# Patient Record
Sex: FEMALE | Race: WHITE | NOT HISPANIC OR LATINO | URBAN - METROPOLITAN AREA
[De-identification: names, ages, dates, MRNs, and addresses within clinical notes are randomized per-mention and may not be internally consistent; named-entity substitution may affect disease eponyms.]

---

## 2018-04-06 ENCOUNTER — OFFICE (AMBULATORY)
Dept: URBAN - METROPOLITAN AREA PATHOLOGY 4 | Facility: PATHOLOGY | Age: 43
End: 2018-04-06
Payer: COMMERCIAL

## 2018-04-06 ENCOUNTER — ON CAMPUS - OUTPATIENT (AMBULATORY)
Dept: URBAN - METROPOLITAN AREA HOSPITAL 2 | Facility: HOSPITAL | Age: 43
End: 2018-04-06
Payer: COMMERCIAL

## 2018-04-06 VITALS
WEIGHT: 176 LBS | SYSTOLIC BLOOD PRESSURE: 112 MMHG | SYSTOLIC BLOOD PRESSURE: 121 MMHG | SYSTOLIC BLOOD PRESSURE: 115 MMHG | SYSTOLIC BLOOD PRESSURE: 114 MMHG | DIASTOLIC BLOOD PRESSURE: 91 MMHG | OXYGEN SATURATION: 99 % | OXYGEN SATURATION: 98 % | TEMPERATURE: 97.5 F | HEART RATE: 76 BPM | HEART RATE: 79 BPM | DIASTOLIC BLOOD PRESSURE: 82 MMHG | HEART RATE: 80 BPM | DIASTOLIC BLOOD PRESSURE: 79 MMHG | HEART RATE: 78 BPM | SYSTOLIC BLOOD PRESSURE: 125 MMHG | RESPIRATION RATE: 20 BRPM | DIASTOLIC BLOOD PRESSURE: 95 MMHG | OXYGEN SATURATION: 97 % | DIASTOLIC BLOOD PRESSURE: 77 MMHG | RESPIRATION RATE: 14 BRPM | SYSTOLIC BLOOD PRESSURE: 109 MMHG | HEART RATE: 81 BPM | HEART RATE: 84 BPM | RESPIRATION RATE: 16 BRPM | SYSTOLIC BLOOD PRESSURE: 139 MMHG | DIASTOLIC BLOOD PRESSURE: 70 MMHG | HEART RATE: 87 BPM | DIASTOLIC BLOOD PRESSURE: 80 MMHG | HEIGHT: 62 IN | DIASTOLIC BLOOD PRESSURE: 75 MMHG

## 2018-04-06 DIAGNOSIS — K64.8 OTHER HEMORRHOIDS: ICD-10-CM

## 2018-04-06 DIAGNOSIS — K63.5 POLYP OF COLON: ICD-10-CM

## 2018-04-06 DIAGNOSIS — D12.2 BENIGN NEOPLASM OF ASCENDING COLON: ICD-10-CM

## 2018-04-06 DIAGNOSIS — Z80.0 FAMILY HISTORY OF MALIGNANT NEOPLASM OF DIGESTIVE ORGANS: ICD-10-CM

## 2018-04-06 PROBLEM — D12.5 BENIGN NEOPLASM OF SIGMOID COLON: Status: ACTIVE | Noted: 2018-04-06

## 2018-04-06 LAB
GI HISTOLOGY: A. UNSPECIFIED: (no result)
GI HISTOLOGY: B. UNSPECIFIED: (no result)
GI HISTOLOGY: PDF REPORT: (no result)

## 2018-04-06 PROCEDURE — 45385 COLONOSCOPY W/LESION REMOVAL: CPT | Mod: 33 | Performed by: INTERNAL MEDICINE

## 2018-04-06 PROCEDURE — 88305 TISSUE EXAM BY PATHOLOGIST: CPT | Mod: 33 | Performed by: INTERNAL MEDICINE

## 2018-04-06 RX ADMIN — PROPOFOL: 10 INJECTION, EMULSION INTRAVENOUS at 10:01

## 2020-08-02 ENCOUNTER — HOSPITAL ENCOUNTER (EMERGENCY)
Facility: HOSPITAL | Age: 45
Discharge: HOME | End: 2020-08-02
Attending: EMERGENCY MEDICINE | Admitting: EMERGENCY MEDICINE

## 2020-08-02 ENCOUNTER — APPOINTMENT (OUTPATIENT)
Dept: GENERAL RADIOLOGY | Facility: HOSPITAL | Age: 45
End: 2020-08-02

## 2020-08-02 VITALS
HEIGHT: 62 IN | OXYGEN SATURATION: 96 % | RESPIRATION RATE: 15 BRPM | BODY MASS INDEX: 34.36 KG/M2 | DIASTOLIC BLOOD PRESSURE: 76 MMHG | TEMPERATURE: 97.7 F | WEIGHT: 186.73 LBS | HEART RATE: 88 BPM | SYSTOLIC BLOOD PRESSURE: 116 MMHG

## 2020-08-02 DIAGNOSIS — R42 DIZZINESS: ICD-10-CM

## 2020-08-02 DIAGNOSIS — K21.9 GASTROESOPHAGEAL REFLUX DISEASE, ESOPHAGITIS PRESENCE NOT SPECIFIED: Primary | ICD-10-CM

## 2020-08-02 DIAGNOSIS — R55 NEAR SYNCOPE: ICD-10-CM

## 2020-08-02 LAB
ANION GAP SERPL CALCULATED.3IONS-SCNC: 12 MMOL/L (ref 5–15)
BASOPHILS # BLD AUTO: 0.1 10*3/MM3 (ref 0–0.2)
BASOPHILS NFR BLD AUTO: 1.4 % (ref 0–1.5)
BUN SERPL-MCNC: 13 MG/DL (ref 6–20)
BUN SERPL-MCNC: ABNORMAL MG/DL
BUN/CREAT SERPL: ABNORMAL
CALCIUM SPEC-SCNC: 9 MG/DL (ref 8.6–10.5)
CHLORIDE SERPL-SCNC: 101 MMOL/L (ref 98–107)
CO2 SERPL-SCNC: 26 MMOL/L (ref 22–29)
CREAT SERPL-MCNC: 0.82 MG/DL (ref 0.57–1)
DEPRECATED RDW RBC AUTO: 40.7 FL (ref 37–54)
EOSINOPHIL # BLD AUTO: 0.3 10*3/MM3 (ref 0–0.4)
EOSINOPHIL NFR BLD AUTO: 3 % (ref 0.3–6.2)
ERYTHROCYTE [DISTWIDTH] IN BLOOD BY AUTOMATED COUNT: 13.3 % (ref 12.3–15.4)
GFR SERPL CREATININE-BSD FRML MDRD: 75 ML/MIN/1.73
GLUCOSE SERPL-MCNC: 173 MG/DL (ref 65–99)
HCT VFR BLD AUTO: 41.9 % (ref 34–46.6)
HGB BLD-MCNC: 14.1 G/DL (ref 12–15.9)
HOLD SPECIMEN: NORMAL
LYMPHOCYTES # BLD AUTO: 2.5 10*3/MM3 (ref 0.7–3.1)
LYMPHOCYTES NFR BLD AUTO: 26.4 % (ref 19.6–45.3)
MCH RBC QN AUTO: 29 PG (ref 26.6–33)
MCHC RBC AUTO-ENTMCNC: 33.5 G/DL (ref 31.5–35.7)
MCV RBC AUTO: 86.5 FL (ref 79–97)
MONOCYTES # BLD AUTO: 0.5 10*3/MM3 (ref 0.1–0.9)
MONOCYTES NFR BLD AUTO: 5.3 % (ref 5–12)
NEUTROPHILS NFR BLD AUTO: 6.1 10*3/MM3 (ref 1.7–7)
NEUTROPHILS NFR BLD AUTO: 63.9 % (ref 42.7–76)
NRBC BLD AUTO-RTO: 0 /100 WBC (ref 0–0.2)
PLATELET # BLD AUTO: 293 10*3/MM3 (ref 140–450)
PMV BLD AUTO: 7.2 FL (ref 6–12)
POTASSIUM SERPL-SCNC: 4.1 MMOL/L (ref 3.5–5.2)
RBC # BLD AUTO: 4.85 10*6/MM3 (ref 3.77–5.28)
SODIUM SERPL-SCNC: 139 MMOL/L (ref 136–145)
TROPONIN T SERPL-MCNC: <0.01 NG/ML (ref 0–0.03)
WBC # BLD AUTO: 9.5 10*3/MM3 (ref 3.4–10.8)
WHOLE BLOOD HOLD SPECIMEN: NORMAL

## 2020-08-02 PROCEDURE — 71045 X-RAY EXAM CHEST 1 VIEW: CPT

## 2020-08-02 PROCEDURE — 84484 ASSAY OF TROPONIN QUANT: CPT | Performed by: EMERGENCY MEDICINE

## 2020-08-02 PROCEDURE — 85025 COMPLETE CBC W/AUTO DIFF WBC: CPT | Performed by: EMERGENCY MEDICINE

## 2020-08-02 PROCEDURE — 93005 ELECTROCARDIOGRAM TRACING: CPT | Performed by: EMERGENCY MEDICINE

## 2020-08-02 PROCEDURE — 80048 BASIC METABOLIC PNL TOTAL CA: CPT | Performed by: EMERGENCY MEDICINE

## 2020-08-02 PROCEDURE — 99284 EMERGENCY DEPT VISIT MOD MDM: CPT

## 2020-08-02 RX ORDER — SODIUM CHLORIDE 0.9 % (FLUSH) 0.9 %
10 SYRINGE (ML) INJECTION AS NEEDED
Status: DISCONTINUED | OUTPATIENT
Start: 2020-08-02 | End: 2020-08-02 | Stop reason: HOSPADM

## 2020-08-02 NOTE — ED PROVIDER NOTES
Subjective   Patient is a 44-year-old female who states he felt like she had heartburn for short period of time then developed some medicine dizziness and near syncope.  This lasted approximately 30 minutes.  She has no complaints at this time other than generalized weakness.          Review of Systems  Negative for headache ears no cough fever chest pain shortness of breath vomiting diarrhea dysuria or other complaint  No past medical history on file.    No Known Allergies    No past surgical history on file.    No family history on file.    Social History     Socioeconomic History   • Marital status:      Spouse name: Not on file   • Number of children: Not on file   • Years of education: Not on file   • Highest education level: Not on file           Objective   Physical Exam  Neurologic exam is nonfocal.  HEENT exam shows TMs to be clear.  Oropharynx comers but sclerae nonicteric.  Neck has no adenopathy JVD or bruits.  Lungs are clear.  Heart is regular rate rhythm without murmur gallop.  Chest is nontender.  Abdomen is soft nontender.  Extremity exam is unremarkable.  Procedures     My EKG interpretation shows normal sinus rhythm with no acute ST change      ED Course      Results for orders placed or performed during the hospital encounter of 08/02/20   Basic Metabolic Panel   Result Value Ref Range    Glucose 173 (H) 65 - 99 mg/dL    BUN      Creatinine 0.82 0.57 - 1.00 mg/dL    Sodium 139 136 - 145 mmol/L    Potassium 4.1 3.5 - 5.2 mmol/L    Chloride 101 98 - 107 mmol/L    CO2 26.0 22.0 - 29.0 mmol/L    Calcium 9.0 8.6 - 10.5 mg/dL    eGFR Non African Amer 75 >60 mL/min/1.73    BUN/Creatinine Ratio      Anion Gap 12.0 5.0 - 15.0 mmol/L   Troponin   Result Value Ref Range    Troponin T <0.010 0.000 - 0.030 ng/mL   CBC Auto Differential   Result Value Ref Range    WBC 9.50 3.40 - 10.80 10*3/mm3    RBC 4.85 3.77 - 5.28 10*6/mm3    Hemoglobin 14.1 12.0 - 15.9 g/dL    Hematocrit 41.9 34.0 - 46.6 %    MCV  86.5 79.0 - 97.0 fL    MCH 29.0 26.6 - 33.0 pg    MCHC 33.5 31.5 - 35.7 g/dL    RDW 13.3 12.3 - 15.4 %    RDW-SD 40.7 37.0 - 54.0 fl    MPV 7.2 6.0 - 12.0 fL    Platelets 293 140 - 450 10*3/mm3    Neutrophil % 63.9 42.7 - 76.0 %    Lymphocyte % 26.4 19.6 - 45.3 %    Monocyte % 5.3 5.0 - 12.0 %    Eosinophil % 3.0 0.3 - 6.2 %    Basophil % 1.4 0.0 - 1.5 %    Neutrophils, Absolute 6.10 1.70 - 7.00 10*3/mm3    Lymphocytes, Absolute 2.50 0.70 - 3.10 10*3/mm3    Monocytes, Absolute 0.50 0.10 - 0.90 10*3/mm3    Eosinophils, Absolute 0.30 0.00 - 0.40 10*3/mm3    Basophils, Absolute 0.10 0.00 - 0.20 10*3/mm3    nRBC 0.0 0.0 - 0.2 /100 WBC   BUN   Result Value Ref Range    BUN 13 6 - 20 mg/dL                                          MDM  Number of Diagnoses or Management Options  Diagnosis management comments: Patient has benign physical exam.  She has no evidence of acute coronary syndrome based on EKG and troponin.  Metabolic panel is normal.  There is no evidence of infectious process.  Patient made at her baseline throughout her ED visit.  She will be discharged to follow with MD for further evaluation as needed.    Risk of Complications, Morbidity, and/or Mortality  Presenting problems: high  Diagnostic procedures: high  Management options: high    Patient Progress  Patient progress: stable      Final diagnoses:   Gastroesophageal reflux disease, esophagitis presence not specified   Dizziness   Near syncope            Tim Marquez MD  08/02/20 6226

## 2023-05-19 PROBLEM — Z80.0 FAMILY HISTORY OF COLON CANCER: Status: ACTIVE | Noted: 2018-04-06

## 2023-09-01 ENCOUNTER — OFFICE (AMBULATORY)
Dept: URBAN - METROPOLITAN AREA PATHOLOGY 4 | Facility: PATHOLOGY | Age: 48
End: 2023-09-01
Payer: COMMERCIAL

## 2023-09-01 ENCOUNTER — ON CAMPUS - OUTPATIENT (AMBULATORY)
Dept: URBAN - METROPOLITAN AREA HOSPITAL 2 | Facility: HOSPITAL | Age: 48
End: 2023-09-01
Payer: COMMERCIAL

## 2023-09-01 VITALS
TEMPERATURE: 96.8 F | SYSTOLIC BLOOD PRESSURE: 124 MMHG | HEART RATE: 85 BPM | OXYGEN SATURATION: 98 % | RESPIRATION RATE: 18 BRPM | DIASTOLIC BLOOD PRESSURE: 87 MMHG | DIASTOLIC BLOOD PRESSURE: 75 MMHG | OXYGEN SATURATION: 99 % | DIASTOLIC BLOOD PRESSURE: 79 MMHG | DIASTOLIC BLOOD PRESSURE: 77 MMHG | OXYGEN SATURATION: 95 % | WEIGHT: 173 LBS | DIASTOLIC BLOOD PRESSURE: 78 MMHG | HEART RATE: 87 BPM | DIASTOLIC BLOOD PRESSURE: 83 MMHG | SYSTOLIC BLOOD PRESSURE: 123 MMHG | RESPIRATION RATE: 16 BRPM | HEART RATE: 93 BPM | OXYGEN SATURATION: 100 % | SYSTOLIC BLOOD PRESSURE: 117 MMHG | DIASTOLIC BLOOD PRESSURE: 92 MMHG | SYSTOLIC BLOOD PRESSURE: 113 MMHG | DIASTOLIC BLOOD PRESSURE: 72 MMHG | SYSTOLIC BLOOD PRESSURE: 108 MMHG | SYSTOLIC BLOOD PRESSURE: 129 MMHG | SYSTOLIC BLOOD PRESSURE: 119 MMHG | HEART RATE: 83 BPM | HEART RATE: 82 BPM | HEART RATE: 90 BPM | SYSTOLIC BLOOD PRESSURE: 128 MMHG | HEIGHT: 62 IN | SYSTOLIC BLOOD PRESSURE: 107 MMHG | HEART RATE: 99 BPM | HEART RATE: 86 BPM | SYSTOLIC BLOOD PRESSURE: 109 MMHG | RESPIRATION RATE: 20 BRPM

## 2023-09-01 DIAGNOSIS — D12.2 BENIGN NEOPLASM OF ASCENDING COLON: ICD-10-CM

## 2023-09-01 DIAGNOSIS — K31.89 OTHER DISEASES OF STOMACH AND DUODENUM: ICD-10-CM

## 2023-09-01 DIAGNOSIS — K31.9 DISEASE OF STOMACH AND DUODENUM, UNSPECIFIED: ICD-10-CM

## 2023-09-01 DIAGNOSIS — K20.80 OTHER ESOPHAGITIS WITHOUT BLEEDING: ICD-10-CM

## 2023-09-01 DIAGNOSIS — Z86.010 PERSONAL HISTORY OF COLONIC POLYPS: ICD-10-CM

## 2023-09-01 DIAGNOSIS — R12 HEARTBURN: ICD-10-CM

## 2023-09-01 PROBLEM — D37.1 NEOPLASM OF UNCERTAIN BEHAVIOR OF STOMACH: Status: ACTIVE | Noted: 2023-09-01

## 2023-09-01 PROBLEM — K63.5 POLYP OF COLON: Status: ACTIVE | Noted: 2023-09-01

## 2023-09-01 PROBLEM — K20.90 ESOPHAGITIS, UNSPECIFIED WITHOUT BLEEDING: Status: ACTIVE | Noted: 2023-09-01

## 2023-09-01 LAB
GI HISTOLOGY: A. SELECT: (no result)
GI HISTOLOGY: B. UNSPECIFIED: (no result)
GI HISTOLOGY: C. UNSPECIFIED: (no result)
GI HISTOLOGY: D. UNSPECIFIED: (no result)
GI HISTOLOGY: PDF REPORT: (no result)

## 2023-09-01 PROCEDURE — 43239 EGD BIOPSY SINGLE/MULTIPLE: CPT | Performed by: INTERNAL MEDICINE

## 2023-09-01 PROCEDURE — 45385 COLONOSCOPY W/LESION REMOVAL: CPT | Mod: 33 | Performed by: INTERNAL MEDICINE

## 2023-09-01 PROCEDURE — 88305 TISSUE EXAM BY PATHOLOGIST: CPT | Performed by: INTERNAL MEDICINE

## 2023-09-01 PROCEDURE — 88342 IMHCHEM/IMCYTCHM 1ST ANTB: CPT | Performed by: INTERNAL MEDICINE

## 2023-09-01 PROCEDURE — 43450 DILATE ESOPHAGUS 1/MULT PASS: CPT | Performed by: INTERNAL MEDICINE

## 2023-09-01 RX ORDER — ESOMEPRAZOLE MAGNESIUM 20 MG/1
20 TABLET, DELAYED RELEASE ORAL
Qty: 90 | Refills: 3 | Status: ACTIVE
Start: 2023-09-01

## 2023-10-27 ENCOUNTER — ON CAMPUS - OUTPATIENT (AMBULATORY)
Dept: URBAN - METROPOLITAN AREA HOSPITAL 77 | Facility: HOSPITAL | Age: 48
End: 2023-10-27

## 2023-10-27 DIAGNOSIS — K29.30 CHRONIC SUPERFICIAL GASTRITIS WITHOUT BLEEDING: ICD-10-CM

## 2023-10-27 DIAGNOSIS — K31.9 DISEASE OF STOMACH AND DUODENUM, UNSPECIFIED: ICD-10-CM

## 2023-10-27 DIAGNOSIS — K22.9 DISEASE OF ESOPHAGUS, UNSPECIFIED: ICD-10-CM

## 2023-10-27 PROCEDURE — 43237 ENDOSCOPIC US EXAM ESOPH: CPT | Performed by: INTERNAL MEDICINE

## 2023-10-27 PROCEDURE — 43239 EGD BIOPSY SINGLE/MULTIPLE: CPT | Performed by: INTERNAL MEDICINE

## 2024-12-16 RX ORDER — DAPAGLIFLOZIN 10 MG/1
TABLET, FILM COATED ORAL
COMMUNITY

## 2024-12-16 RX ORDER — LISINOPRIL 20 MG/1
20 TABLET ORAL DAILY
COMMUNITY

## 2024-12-16 RX ORDER — ASPIRIN 81 MG/1
81 TABLET ORAL DAILY
COMMUNITY

## 2024-12-16 RX ORDER — ATORVASTATIN CALCIUM 10 MG/1
10 TABLET, FILM COATED ORAL DAILY
COMMUNITY

## 2024-12-16 RX ORDER — MULTIPLE VITAMINS W/ MINERALS TAB 9MG-400MCG
1 TAB ORAL DAILY
COMMUNITY

## 2024-12-16 RX ORDER — PLECANATIDE 3 MG/1
TABLET ORAL
COMMUNITY

## 2024-12-29 ENCOUNTER — ANESTHESIA EVENT (OUTPATIENT)
Dept: GASTROENTEROLOGY | Facility: HOSPITAL | Age: 49
End: 2024-12-29
Payer: COMMERCIAL

## 2024-12-29 NOTE — ANESTHESIA PREPROCEDURE EVALUATION
Anesthesia Evaluation     Patient summary reviewed and Nursing notes reviewed   NPO Solid Status: > 8 hours  NPO Liquid Status: > 8 hours           Airway   Mallampati: II  TM distance: >3 FB  Neck ROM: full  No difficulty expected  Dental - normal exam     Pulmonary - negative pulmonary ROS and normal exam    breath sounds clear to auscultation  Cardiovascular - normal exam  Exercise tolerance: unable to assess    ECG reviewed  Rhythm: regular  Rate: normal    (+) hypertension, hyperlipidemia      Neuro/Psych- negative ROS  GI/Hepatic/Renal/Endo    (+) GERD, diabetes mellitus    Musculoskeletal (-) negative ROS    Abdominal  - normal exam   Substance History - negative use     OB/GYN negative ob/gyn ROS         Other - negative ROS                   Anesthesia Plan    ASA 2     general     (Reason for EUS: F/u on Esophageal nodule , MAC)  intravenous induction     Anesthetic plan, risks, benefits, and alternatives have been provided, discussed and informed consent has been obtained with: patient.    CODE STATUS:

## 2024-12-30 ENCOUNTER — ON CAMPUS - OUTPATIENT (AMBULATORY)
Dept: URBAN - METROPOLITAN AREA HOSPITAL 85 | Facility: HOSPITAL | Age: 49
End: 2024-12-30
Payer: COMMERCIAL

## 2024-12-30 ENCOUNTER — ANESTHESIA (OUTPATIENT)
Dept: GASTROENTEROLOGY | Facility: HOSPITAL | Age: 49
End: 2024-12-30
Payer: COMMERCIAL

## 2024-12-30 ENCOUNTER — HOSPITAL ENCOUNTER (OUTPATIENT)
Facility: HOSPITAL | Age: 49
Setting detail: HOSPITAL OUTPATIENT SURGERY
Discharge: HOME | End: 2024-12-30
Attending: INTERNAL MEDICINE | Admitting: INTERNAL MEDICINE
Payer: COMMERCIAL

## 2024-12-30 VITALS
TEMPERATURE: 98.4 F | BODY MASS INDEX: 31.64 KG/M2 | HEART RATE: 77 BPM | OXYGEN SATURATION: 94 % | DIASTOLIC BLOOD PRESSURE: 64 MMHG | SYSTOLIC BLOOD PRESSURE: 109 MMHG | RESPIRATION RATE: 15 BRPM | WEIGHT: 171.96 LBS | HEIGHT: 62 IN

## 2024-12-30 DIAGNOSIS — K29.50 UNSPECIFIED CHRONIC GASTRITIS WITHOUT BLEEDING: ICD-10-CM

## 2024-12-30 DIAGNOSIS — K31.89 GASTRIC NODULE: ICD-10-CM

## 2024-12-30 DIAGNOSIS — K22.89 OTHER SPECIFIED DISEASE OF ESOPHAGUS: ICD-10-CM

## 2024-12-30 DIAGNOSIS — K31.89 OTHER DISEASES OF STOMACH AND DUODENUM: ICD-10-CM

## 2024-12-30 LAB — GLUCOSE BLDC GLUCOMTR-MCNC: 139 MG/DL (ref 70–105)

## 2024-12-30 PROCEDURE — 25010000002 LIDOCAINE PF 2% 2 % SOLUTION

## 2024-12-30 PROCEDURE — 82948 REAGENT STRIP/BLOOD GLUCOSE: CPT

## 2024-12-30 PROCEDURE — 88305 TISSUE EXAM BY PATHOLOGIST: CPT | Performed by: INTERNAL MEDICINE

## 2024-12-30 PROCEDURE — 88342 IMHCHEM/IMCYTCHM 1ST ANTB: CPT | Performed by: INTERNAL MEDICINE

## 2024-12-30 PROCEDURE — 25010000002 FENTANYL CITRATE (PF) 100 MCG/2ML SOLUTION

## 2024-12-30 PROCEDURE — 43239 EGD BIOPSY SINGLE/MULTIPLE: CPT | Performed by: INTERNAL MEDICINE

## 2024-12-30 PROCEDURE — 43259 EGD US EXAM DUODENUM/JEJUNUM: CPT | Performed by: INTERNAL MEDICINE

## 2024-12-30 PROCEDURE — 25010000002 PROPOFOL 10 MG/ML EMULSION

## 2024-12-30 PROCEDURE — 25810000003 SODIUM CHLORIDE 0.9 % SOLUTION

## 2024-12-30 RX ORDER — DIPHENHYDRAMINE HYDROCHLORIDE 50 MG/ML
12.5 INJECTION INTRAMUSCULAR; INTRAVENOUS
Status: DISCONTINUED | OUTPATIENT
Start: 2024-12-30 | End: 2024-12-30 | Stop reason: HOSPADM

## 2024-12-30 RX ORDER — METRONIDAZOLE 500 MG/100ML
INJECTION, SOLUTION INTRAVENOUS
Status: DISCONTINUED
Start: 2024-12-30 | End: 2024-12-30 | Stop reason: WASHOUT

## 2024-12-30 RX ORDER — FENTANYL CITRATE 50 UG/ML
INJECTION, SOLUTION INTRAMUSCULAR; INTRAVENOUS AS NEEDED
Status: DISCONTINUED | OUTPATIENT
Start: 2024-12-30 | End: 2024-12-30 | Stop reason: SURG

## 2024-12-30 RX ORDER — EPHEDRINE SULFATE 5 MG/ML
5 INJECTION INTRAVENOUS ONCE AS NEEDED
Status: DISCONTINUED | OUTPATIENT
Start: 2024-12-30 | End: 2024-12-30 | Stop reason: HOSPADM

## 2024-12-30 RX ORDER — IPRATROPIUM BROMIDE AND ALBUTEROL SULFATE 2.5; .5 MG/3ML; MG/3ML
3 SOLUTION RESPIRATORY (INHALATION) ONCE AS NEEDED
Status: DISCONTINUED | OUTPATIENT
Start: 2024-12-30 | End: 2024-12-30 | Stop reason: HOSPADM

## 2024-12-30 RX ORDER — ONDANSETRON 2 MG/ML
4 INJECTION INTRAMUSCULAR; INTRAVENOUS ONCE AS NEEDED
Status: DISCONTINUED | OUTPATIENT
Start: 2024-12-30 | End: 2024-12-30 | Stop reason: HOSPADM

## 2024-12-30 RX ORDER — CEFTRIAXONE 2 G/1
INJECTION, POWDER, FOR SOLUTION INTRAMUSCULAR; INTRAVENOUS
Status: DISCONTINUED
Start: 2024-12-30 | End: 2024-12-30 | Stop reason: WASHOUT

## 2024-12-30 RX ORDER — LIDOCAINE HYDROCHLORIDE 20 MG/ML
INJECTION, SOLUTION EPIDURAL; INFILTRATION; INTRACAUDAL; PERINEURAL AS NEEDED
Status: DISCONTINUED | OUTPATIENT
Start: 2024-12-30 | End: 2024-12-30 | Stop reason: SURG

## 2024-12-30 RX ORDER — PROPOFOL 10 MG/ML
VIAL (ML) INTRAVENOUS AS NEEDED
Status: DISCONTINUED | OUTPATIENT
Start: 2024-12-30 | End: 2024-12-30 | Stop reason: SURG

## 2024-12-30 RX ORDER — SODIUM CHLORIDE 9 MG/ML
INJECTION, SOLUTION INTRAVENOUS CONTINUOUS PRN
Status: DISCONTINUED | OUTPATIENT
Start: 2024-12-30 | End: 2024-12-30 | Stop reason: SURG

## 2024-12-30 RX ORDER — MEPERIDINE HYDROCHLORIDE 25 MG/ML
12.5 INJECTION INTRAMUSCULAR; INTRAVENOUS; SUBCUTANEOUS
Status: DISCONTINUED | OUTPATIENT
Start: 2024-12-30 | End: 2024-12-30 | Stop reason: HOSPADM

## 2024-12-30 RX ORDER — LABETALOL HYDROCHLORIDE 5 MG/ML
5 INJECTION, SOLUTION INTRAVENOUS
Status: DISCONTINUED | OUTPATIENT
Start: 2024-12-30 | End: 2024-12-30 | Stop reason: HOSPADM

## 2024-12-30 RX ORDER — HYDRALAZINE HYDROCHLORIDE 20 MG/ML
5 INJECTION INTRAMUSCULAR; INTRAVENOUS
Status: DISCONTINUED | OUTPATIENT
Start: 2024-12-30 | End: 2024-12-30 | Stop reason: HOSPADM

## 2024-12-30 RX ORDER — SODIUM CHLORIDE 9 MG/ML
INJECTION, SOLUTION INTRAVENOUS
Status: COMPLETED
Start: 2024-12-30 | End: 2024-12-30

## 2024-12-30 RX ADMIN — LIDOCAINE HYDROCHLORIDE 100 MG: 20 INJECTION, SOLUTION EPIDURAL; INFILTRATION; INTRACAUDAL; PERINEURAL at 10:20

## 2024-12-30 RX ADMIN — SODIUM CHLORIDE: 9 INJECTION, SOLUTION INTRAVENOUS at 10:12

## 2024-12-30 RX ADMIN — PROPOFOL INJECTABLE EMULSION 50 MG: 10 INJECTION, EMULSION INTRAVENOUS at 10:20

## 2024-12-30 RX ADMIN — PROPOFOL INJECTABLE EMULSION 125 MCG/KG/MIN: 10 INJECTION, EMULSION INTRAVENOUS at 10:21

## 2024-12-30 RX ADMIN — FENTANYL CITRATE 25 MCG: 50 INJECTION, SOLUTION INTRAMUSCULAR; INTRAVENOUS at 10:25

## 2024-12-30 RX ADMIN — FENTANYL CITRATE 50 MCG: 50 INJECTION, SOLUTION INTRAMUSCULAR; INTRAVENOUS at 10:40

## 2024-12-30 RX ADMIN — FENTANYL CITRATE 25 MCG: 50 INJECTION, SOLUTION INTRAMUSCULAR; INTRAVENOUS at 10:20

## 2024-12-30 NOTE — OP NOTE
"ESOPHAGOGASTRODUODENOSCOPY WITH ULTRASOUND AND FINE NEEDLE ASPIRATION Procedure Report    Patient Name:  Alejandra Gilbert  YOB: 1975    Date of Surgery:  12/30/2024     Pre-Op Diagnosis:  SUBMUCOSAL ANTRAL NODULE 1 YEAR RECALL       Post-Op Diagnosis Codes:  Antrum submucosal nodule  Inlet patch      Procedure/CPT® Codes:      Procedure(s):  ENDOSCOPIC ULTRASOUND WITH ESOPHAGOGASTRODUODENOSCOPY WITH BIOPSY    Staff:  Surgeon(s):  Derrell Mandujano MD      Anesthesia: Monitored Anesthesia Care    Description of Procedure:  A description of the procedure as well as risks, benefits and alternative methods were explained to the patient who voiced understanding and signed the corresponding consent form. A physical exam was performed and vital signs were monitored throughout the procedure.    An upper GI endoscope was placed into the mouth and proceeded through the esophagus, stomach and second portion of the duodenum without difficulty. The scope was then retroflexed and the fundus was visualized. The procedure was not difficult and there were no immediate complications.    A pentex Radial echoendoscope was advanced into the mouth, esophagus, and into the stomach. The celiac axis was visualized, next the scope was used to visualize the pancreatic neck, body, and tail as well as the L lobe of the liver. The scope was advanced into the duodenal bulb where the pancreatic head and ampulla were visualized as well as the biliary tree and R lobe of the liver. The scope was then advanced to the second portion of the duodenum where the uncinate was brought into view and the ampulla in the \" kissing the papilla\" view. The scope was then withdrawn back into the stomach and out of the esophagus. There was no blood loss.      Impression:    EGD Findings:   1.  Inlet patch in the upper third of the esophagus  2.  1 cm gastric antrum submucosal lesion visualized endoscopically, cold forcep biopsies with bite on bite " taken to attempt to obtain submucosal tissue for pathology  3.  Normal duodenal mucosa visualized to D2    EUS Findings:   1.  1.2 cm x 3 mm hypoechoic submucosal lesion in the antrum unchanged from previous endoscopic ultrasound  2.  Normal pancreatic EUS  3.  Normal biliary EUS  4.  Normal celiac axis    Recommendations:  1.  Given stability of submucosal lesion, can reperform endoscopic ultrasound in 2 years for surveillance of lesion unless pathology is confirmatory of a benign etiology      Derrell Mandujano MD     Date: 12/30/2024    Time: 10:48 EST

## 2024-12-30 NOTE — ANESTHESIA POSTPROCEDURE EVALUATION
Patient: Alejandra Gilbert    Procedure Summary       Date: 12/30/24 Room / Location: [facility] ENDOSCOPY 2 / [facility] ENDOSCOPY    Anesthesia Start: 1012 Anesthesia Stop: 1042    Procedure: ENDOSCOPIC ULTRASOUND WITH ESOPHAGOGASTRODUODENOSCOPY WITH BIOPSY Diagnosis: (SUBMUCOSAL ANTRAL NODULE 1 YEAR RECALL)    Surgeons: Derrell Mandujano MD Provider: Maxwell Rose MD    Anesthesia Type: general ASA Status: 2            Anesthesia Type: general    Vitals  Vitals Value Taken Time   /64 12/30/24 1107   Temp     Pulse 71 12/30/24 1106   Resp 15 12/30/24 1105   SpO2 94 % 12/30/24 1106   Vitals shown include unfiled device data.        Post Anesthesia Care and Evaluation    Patient location during evaluation: PHASE II  Patient participation: complete - patient participated  Level of consciousness: awake  Pain scale: See nurse's notes for pain score.  Pain management: adequate    Airway patency: patent  Anesthetic complications: No anesthetic complications  PONV Status: none  Cardiovascular status: acceptable  Respiratory status: acceptable and spontaneous ventilation  Hydration status: acceptable    Comments: Patient seen and examined postoperatively; vital signs stable; SpO2 greater than or equal to 90%; cardiopulmonary status stable; nausea/vomiting adequately controlled; pain adequately controlled; no apparent anesthesia complications; patient discharged from anesthesia care when discharge criteria were met

## 2024-12-30 NOTE — DISCHARGE INSTRUCTIONS
A responsible adult should stay with you and you should rest quietly for the rest of the day.    Do not drink alcohol, drive, operate any heavy machinery or power tools or make any legal/important decisions for the next 24 hours.     Progress your diet as tolerated.  If you begin to experience severe pain, increased shortness of breath, racing heartbeat or a fever above 101 F, seek immediate medical attention.     Follow up with MD as instructed. Call office for results in 3 to 5 days if needed.     Dr Mandujano @ [phone]           Impression:     EGD Findings:   1.  Inlet patch in the upper third of the esophagus  2.  1 cm gastric antrum submucosal lesion visualized endoscopically, cold forcep biopsies with bite on bite taken to attempt to obtain submucosal tissue for pathology  3.  Normal duodenal mucosa visualized to D2     EUS Findings:   1.  1.2 cm x 3 mm hypoechoic submucosal lesion in the antrum unchanged from previous endoscopic ultrasound  2.  Normal pancreatic EUS  3.  Normal biliary EUS  4.  Normal celiac axis     Recommendations:  1.  Given stability of submucosal lesion, can reperform endoscopic ultrasound in 2 years for surveillance of lesion unless pathology is confirmatory of a benign etiology        Derrell Mandujano MD      Date: 12/30/2024    Time: 10:48 EST

## 2024-12-30 NOTE — H&P
GI CONSULT  NOTE:    Referring Provider:    Kiarra Schumacher MD Obert, Jonathan, MD    Chief complaint: <principal problem not specified>    Subjective .       Pre op diagnosis  SUBMUCOSAL ANTRAL NODULE 1 YEAR RECALL      History of present illness:      Alejandra Gilbert is a 49 y.o. female who presents today for Procedure(s):  ENDOSCOPIC ULTRASOUND for the indications listed below.     The updated Patient Profile was reviewed prior to the procedure, in conjunction with the Physical Exam, including medical conditions, surgical procedures, medications, allergies, family history and social history.     Pre-operatively, I reviewed the indication(s) for the procedure, the risks of the procedure [including but not limited to: unexpected bleeding possibly requiring hospitalization and/or unplanned repeat procedures, perforation possibly requiring surgical treatment, missed lesions and complications of sedation/MAC (also explained by anesthesia staff)].     I have evaluated the patient for risks associated with the planned anesthesia and the procedure to be performed and find the patient an acceptable candidate for IV sedation.    Multiple opportunities were provided for any questions or concerns, and all questions were answered satisfactorily before any anesthesia was administered. We will proceed with the planned procedure.    Past Medical History:  Past Medical History:   Diagnosis Date    Diabetes mellitus     GERD (gastroesophageal reflux disease)     Hyperlipidemia     Hypertension        Past Surgical History:  Past Surgical History:   Procedure Laterality Date    CHOLECYSTECTOMY      COLONOSCOPY      ENDOSCOPY      HYSTERECTOMY      TONSILLECTOMY         Social History:  Social History     Tobacco Use    Smoking status: Never    Smokeless tobacco: Never   Vaping Use    Vaping status: Never Used   Substance Use Topics    Alcohol use: Not Currently    Drug use: Never       Family History:  History reviewed. No  "pertinent family history.    Medications:  Medications Prior to Admission   Medication Sig Dispense Refill Last Dose/Taking    aspirin 81 MG EC tablet Take 1 tablet by mouth Daily.   12/29/2024    atorvastatin (LIPITOR) 10 MG tablet Take 1 tablet by mouth Daily.   12/29/2024    dapagliflozin Propanediol (Farxiga) 10 MG tablet Take  by mouth.   12/29/2024    esomeprazole (nexIUM) 20 MG capsule Take 1 capsule by mouth Every Morning Before Breakfast.   12/29/2024    lisinopril (PRINIVIL,ZESTRIL) 20 MG tablet Take 1 tablet by mouth Daily.   12/29/2024    metFORMIN (GLUCOPHAGE) 500 MG tablet Take 4 tablets by mouth Daily With Breakfast.   12/29/2024    multivitamin with minerals tablet tablet Take 1 tablet by mouth Daily.   12/29/2024    Plecanatide (Trulance) 3 MG tablet Take  by mouth.   12/29/2024       Scheduled Meds:cefTRIAXone, , ,   metroNIDAZOLE, , ,   sodium chloride, , ,       Continuous Infusions:   PRN Meds:.  cefTRIAXone    metroNIDAZOLE    sodium chloride    ALLERGIES:  Patient has no known allergies.    ROS:  The following systems were reviewed and negative;  Constitution:  No fevers, chills, no unintentional weight loss  Skin: no rash, no jaundice  Eyes:  No blurry vision, no eye pain  HENT:  No change in hearing or smell  Resp:  No dyspnea or cough  CV:  No chest pain or palpitations  :  No dysuria, hematuria  Musculoskeletal:  No leg cramps or arthralgias  Neuro:  No tremor, no numbness  Psych:  No depression or confsusion    Objective     Vital Signs:   Vitals:    12/16/24 1513 12/30/24 0917   BP:  116/78   BP Location:  Left arm   Patient Position:  Lying   Pulse:  82   Resp:  15   Temp:  98.4 °F (36.9 °C)   TempSrc:  Oral   SpO2:  95%   Weight: 79.4 kg (175 lb) 78 kg (171 lb 15.3 oz)   Height: 157.5 cm (62\") 157.5 cm (62.01\")       Physical Exam:       General Appearance:    Awake and alert, in no acute distress   Head:    Normocephalic, without obvious abnormality, atraumatic   Throat:   No oral " lesions, no thrush, oral mucosa moist   Lungs:     respirations regular, even and unlabored   Skin:   No rash, no jaundice       Results Review:  Lab Results (last 24 hours)       Procedure Component Value Units Date/Time    POC Glucose Once [[id number]]  (Abnormal) Collected: 12/30/24 0913    Specimen: Blood Updated: 12/30/24 0915     Glucose 139 mg/dL      Comment: Serial Number: [id number]:  [id number]               Imaging Results (Last 24 Hours)       ** No results found for the last 24 hours. **             I reviewed the patient's labs and imaging.    ASSESSMENT AND PLAN:      Active Problems:    * No active hospital problems. *       Procedure(s):  ENDOSCOPIC ULTRASOUND      I discussed the patient's findings and my recommendations with the patient.    Derrell Mandujano MD  12/30/24  10:14 EST

## 2025-01-02 LAB
LAB AP CASE REPORT: NORMAL
LAB AP DIAGNOSIS COMMENT: NORMAL
PATH REPORT.FINAL DX SPEC: NORMAL
PATH REPORT.GROSS SPEC: NORMAL

## 2025-03-25 ENCOUNTER — APPOINTMENT (OUTPATIENT)
Dept: GENERAL RADIOLOGY | Facility: HOSPITAL | Age: 50
End: 2025-03-25
Payer: COMMERCIAL

## 2025-03-25 ENCOUNTER — HOSPITAL ENCOUNTER (OUTPATIENT)
Facility: HOSPITAL | Age: 50
Setting detail: OBSERVATION
Discharge: HOME | End: 2025-03-26
Attending: EMERGENCY MEDICINE | Admitting: EMERGENCY MEDICINE
Payer: COMMERCIAL

## 2025-03-25 ENCOUNTER — APPOINTMENT (OUTPATIENT)
Dept: CARDIOLOGY | Facility: HOSPITAL | Age: 50
End: 2025-03-25
Payer: COMMERCIAL

## 2025-03-25 ENCOUNTER — APPOINTMENT (OUTPATIENT)
Dept: CT IMAGING | Facility: HOSPITAL | Age: 50
End: 2025-03-25
Payer: COMMERCIAL

## 2025-03-25 DIAGNOSIS — R00.2 PALPITATIONS: ICD-10-CM

## 2025-03-25 DIAGNOSIS — R07.89 OTHER CHEST PAIN: ICD-10-CM

## 2025-03-25 DIAGNOSIS — R07.9 CHEST PAIN, UNSPECIFIED TYPE: Primary | ICD-10-CM

## 2025-03-25 LAB
ALBUMIN SERPL-MCNC: 4.6 G/DL (ref 3.5–5.2)
ALBUMIN/GLOB SERPL: 1.8 G/DL
ALP SERPL-CCNC: 104 U/L (ref 39–117)
ALT SERPL W P-5'-P-CCNC: 31 U/L (ref 1–33)
ANION GAP SERPL CALCULATED.3IONS-SCNC: 14.7 MMOL/L (ref 5–15)
AST SERPL-CCNC: 23 U/L (ref 1–32)
BASOPHILS # BLD AUTO: 0.08 10*3/MM3 (ref 0–0.2)
BASOPHILS NFR BLD AUTO: 0.9 % (ref 0–1.5)
BILIRUB SERPL-MCNC: 0.9 MG/DL (ref 0–1.2)
BUN SERPL-MCNC: 9 MG/DL (ref 6–20)
BUN/CREAT SERPL: 11.8 (ref 7–25)
CALCIUM SPEC-SCNC: 9.6 MG/DL (ref 8.6–10.5)
CHLORIDE SERPL-SCNC: 103 MMOL/L (ref 98–107)
CO2 SERPL-SCNC: 22.3 MMOL/L (ref 22–29)
CORTIS SERPL-MCNC: 7.53 MCG/DL
CREAT SERPL-MCNC: 0.76 MG/DL (ref 0.57–1)
D DIMER PPP FEU-MCNC: <0.27 MCGFEU/ML (ref 0–0.51)
DEPRECATED RDW RBC AUTO: 42.2 FL (ref 37–54)
EGFRCR SERPLBLD CKD-EPI 2021: 95 ML/MIN/1.73
EOSINOPHIL # BLD AUTO: 0.34 10*3/MM3 (ref 0–0.4)
EOSINOPHIL NFR BLD AUTO: 3.8 % (ref 0.3–6.2)
ERYTHROCYTE [DISTWIDTH] IN BLOOD BY AUTOMATED COUNT: 13 % (ref 12.3–15.4)
GEN 5 1HR TROPONIN T REFLEX: 14 NG/L
GLOBULIN UR ELPH-MCNC: 2.6 GM/DL
GLUCOSE BLDC GLUCOMTR-MCNC: 170 MG/DL (ref 70–105)
GLUCOSE SERPL-MCNC: 91 MG/DL (ref 65–99)
HBA1C MFR BLD: 6.9 % (ref 4.8–5.6)
HCT VFR BLD AUTO: 47.4 % (ref 34–46.6)
HGB BLD-MCNC: 15.4 G/DL (ref 12–15.9)
HOLD SPECIMEN: NORMAL
HOLD SPECIMEN: NORMAL
IMM GRANULOCYTES # BLD AUTO: 0.03 10*3/MM3 (ref 0–0.05)
IMM GRANULOCYTES NFR BLD AUTO: 0.3 % (ref 0–0.5)
LYMPHOCYTES # BLD AUTO: 2.55 10*3/MM3 (ref 0.7–3.1)
LYMPHOCYTES NFR BLD AUTO: 28.6 % (ref 19.6–45.3)
MAGNESIUM SERPL-MCNC: 2.2 MG/DL (ref 1.6–2.6)
MCH RBC QN AUTO: 28.7 PG (ref 26.6–33)
MCHC RBC AUTO-ENTMCNC: 32.5 G/DL (ref 31.5–35.7)
MCV RBC AUTO: 88.3 FL (ref 79–97)
MONOCYTES # BLD AUTO: 0.69 10*3/MM3 (ref 0.1–0.9)
MONOCYTES NFR BLD AUTO: 7.7 % (ref 5–12)
NEUTROPHILS NFR BLD AUTO: 5.23 10*3/MM3 (ref 1.7–7)
NEUTROPHILS NFR BLD AUTO: 58.7 % (ref 42.7–76)
NRBC BLD AUTO-RTO: 0 /100 WBC (ref 0–0.2)
PLATELET # BLD AUTO: 311 10*3/MM3 (ref 140–450)
PMV BLD AUTO: 9.2 FL (ref 6–12)
POTASSIUM SERPL-SCNC: 4.2 MMOL/L (ref 3.5–5.2)
PROT SERPL-MCNC: 7.2 G/DL (ref 6–8.5)
RBC # BLD AUTO: 5.37 10*6/MM3 (ref 3.77–5.28)
SODIUM SERPL-SCNC: 140 MMOL/L (ref 136–145)
TROPONIN T NUMERIC DELTA: 1 NG/L
TROPONIN T SERPL HS-MCNC: 13 NG/L
TSH SERPL DL<=0.05 MIU/L-ACNC: 1.25 UIU/ML (ref 0.27–4.2)
WBC NRBC COR # BLD AUTO: 8.92 10*3/MM3 (ref 3.4–10.8)
WHOLE BLOOD HOLD COAG: NORMAL
WHOLE BLOOD HOLD SPECIMEN: NORMAL

## 2025-03-25 PROCEDURE — G0378 HOSPITAL OBSERVATION PER HR: HCPCS

## 2025-03-25 PROCEDURE — 82948 REAGENT STRIP/BLOOD GLUCOSE: CPT

## 2025-03-25 PROCEDURE — 99204 OFFICE O/P NEW MOD 45 MIN: CPT | Performed by: INTERNAL MEDICINE

## 2025-03-25 PROCEDURE — 93306 TTE W/DOPPLER COMPLETE: CPT | Performed by: INTERNAL MEDICINE

## 2025-03-25 PROCEDURE — 36415 COLL VENOUS BLD VENIPUNCTURE: CPT

## 2025-03-25 PROCEDURE — 99285 EMERGENCY DEPT VISIT HI MDM: CPT

## 2025-03-25 PROCEDURE — 71045 X-RAY EXAM CHEST 1 VIEW: CPT

## 2025-03-25 PROCEDURE — 93356 MYOCRD STRAIN IMG SPCKL TRCK: CPT

## 2025-03-25 PROCEDURE — 93005 ELECTROCARDIOGRAM TRACING: CPT | Performed by: EMERGENCY MEDICINE

## 2025-03-25 PROCEDURE — 93356 MYOCRD STRAIN IMG SPCKL TRCK: CPT | Performed by: INTERNAL MEDICINE

## 2025-03-25 PROCEDURE — 82533 TOTAL CORTISOL: CPT | Performed by: INTERNAL MEDICINE

## 2025-03-25 PROCEDURE — 83036 HEMOGLOBIN GLYCOSYLATED A1C: CPT | Performed by: NURSE PRACTITIONER

## 2025-03-25 PROCEDURE — 83735 ASSAY OF MAGNESIUM: CPT | Performed by: EMERGENCY MEDICINE

## 2025-03-25 PROCEDURE — 93306 TTE W/DOPPLER COMPLETE: CPT

## 2025-03-25 PROCEDURE — 80050 GENERAL HEALTH PANEL: CPT | Performed by: EMERGENCY MEDICINE

## 2025-03-25 PROCEDURE — 84484 ASSAY OF TROPONIN QUANT: CPT | Performed by: EMERGENCY MEDICINE

## 2025-03-25 PROCEDURE — 85379 FIBRIN DEGRADATION QUANT: CPT | Performed by: EMERGENCY MEDICINE

## 2025-03-25 RX ORDER — ASPIRIN 81 MG/1
324 TABLET, CHEWABLE ORAL ONCE
Status: COMPLETED | OUTPATIENT
Start: 2025-03-25 | End: 2025-03-25

## 2025-03-25 RX ORDER — SODIUM CHLORIDE 9 MG/ML
40 INJECTION, SOLUTION INTRAVENOUS AS NEEDED
Status: DISCONTINUED | OUTPATIENT
Start: 2025-03-25 | End: 2025-03-26 | Stop reason: HOSPADM

## 2025-03-25 RX ORDER — NITROGLYCERIN 0.4 MG/1
0.4 TABLET SUBLINGUAL
Status: DISCONTINUED | OUTPATIENT
Start: 2025-03-25 | End: 2025-03-25 | Stop reason: SDUPTHER

## 2025-03-25 RX ORDER — DEXTROSE MONOHYDRATE 25 G/50ML
25 INJECTION, SOLUTION INTRAVENOUS
Status: DISCONTINUED | OUTPATIENT
Start: 2025-03-25 | End: 2025-03-26 | Stop reason: HOSPADM

## 2025-03-25 RX ORDER — ACETAMINOPHEN 325 MG/1
650 TABLET ORAL EVERY 4 HOURS PRN
Status: DISCONTINUED | OUTPATIENT
Start: 2025-03-25 | End: 2025-03-26 | Stop reason: HOSPADM

## 2025-03-25 RX ORDER — SODIUM CHLORIDE 0.9 % (FLUSH) 0.9 %
10 SYRINGE (ML) INJECTION EVERY 12 HOURS SCHEDULED
Status: DISCONTINUED | OUTPATIENT
Start: 2025-03-25 | End: 2025-03-26 | Stop reason: HOSPADM

## 2025-03-25 RX ORDER — METOPROLOL TARTRATE 25 MG/1
25 TABLET, FILM COATED ORAL EVERY 6 HOURS
Status: DISCONTINUED | OUTPATIENT
Start: 2025-03-25 | End: 2025-03-26 | Stop reason: HOSPADM

## 2025-03-25 RX ORDER — ASPIRIN 81 MG/1
81 TABLET ORAL DAILY
Status: DISCONTINUED | OUTPATIENT
Start: 2025-03-26 | End: 2025-03-26 | Stop reason: HOSPADM

## 2025-03-25 RX ORDER — NICOTINE POLACRILEX 4 MG
15 LOZENGE BUCCAL
Status: DISCONTINUED | OUTPATIENT
Start: 2025-03-25 | End: 2025-03-26 | Stop reason: HOSPADM

## 2025-03-25 RX ORDER — SODIUM CHLORIDE 0.9 % (FLUSH) 0.9 %
10 SYRINGE (ML) INJECTION AS NEEDED
Status: DISCONTINUED | OUTPATIENT
Start: 2025-03-25 | End: 2025-03-26 | Stop reason: HOSPADM

## 2025-03-25 RX ORDER — ACETAMINOPHEN 160 MG/5ML
650 SOLUTION ORAL EVERY 4 HOURS PRN
Status: DISCONTINUED | OUTPATIENT
Start: 2025-03-25 | End: 2025-03-26 | Stop reason: HOSPADM

## 2025-03-25 RX ORDER — IBUPROFEN 600 MG/1
1 TABLET ORAL
Status: DISCONTINUED | OUTPATIENT
Start: 2025-03-25 | End: 2025-03-26 | Stop reason: HOSPADM

## 2025-03-25 RX ORDER — NITROGLYCERIN 0.4 MG/1
0.4 TABLET SUBLINGUAL
Status: DISCONTINUED | OUTPATIENT
Start: 2025-03-26 | End: 2025-03-26 | Stop reason: HOSPADM

## 2025-03-25 RX ORDER — AMOXICILLIN 250 MG
2 CAPSULE ORAL 2 TIMES DAILY PRN
Status: DISCONTINUED | OUTPATIENT
Start: 2025-03-25 | End: 2025-03-26 | Stop reason: HOSPADM

## 2025-03-25 RX ORDER — POLYETHYLENE GLYCOL 3350 17 G/17G
17 POWDER, FOR SOLUTION ORAL DAILY PRN
Status: DISCONTINUED | OUTPATIENT
Start: 2025-03-25 | End: 2025-03-26 | Stop reason: HOSPADM

## 2025-03-25 RX ORDER — LEVOCETIRIZINE DIHYDROCHLORIDE 5 MG/1
5 TABLET, FILM COATED ORAL DAILY
COMMUNITY

## 2025-03-25 RX ORDER — ALUMINA, MAGNESIA, AND SIMETHICONE 2400; 2400; 240 MG/30ML; MG/30ML; MG/30ML
15 SUSPENSION ORAL EVERY 6 HOURS PRN
Status: DISCONTINUED | OUTPATIENT
Start: 2025-03-25 | End: 2025-03-26 | Stop reason: HOSPADM

## 2025-03-25 RX ORDER — BISACODYL 5 MG/1
5 TABLET, DELAYED RELEASE ORAL DAILY PRN
Status: DISCONTINUED | OUTPATIENT
Start: 2025-03-25 | End: 2025-03-26 | Stop reason: HOSPADM

## 2025-03-25 RX ORDER — COSYNTROPIN 0.25 MG/ML
0.25 INJECTION, POWDER, FOR SOLUTION INTRAMUSCULAR; INTRAVENOUS ONCE
Status: DISCONTINUED | OUTPATIENT
Start: 2025-03-26 | End: 2025-03-26 | Stop reason: HOSPADM

## 2025-03-25 RX ORDER — NITROGLYCERIN 0.4 MG/1
0.4 TABLET SUBLINGUAL
Status: DISCONTINUED | OUTPATIENT
Start: 2025-03-25 | End: 2025-03-26 | Stop reason: HOSPADM

## 2025-03-25 RX ORDER — ACETAMINOPHEN 650 MG/1
650 SUPPOSITORY RECTAL EVERY 4 HOURS PRN
Status: DISCONTINUED | OUTPATIENT
Start: 2025-03-25 | End: 2025-03-26 | Stop reason: HOSPADM

## 2025-03-25 RX ORDER — CETIRIZINE HYDROCHLORIDE 10 MG/1
10 TABLET ORAL DAILY
Status: DISCONTINUED | OUTPATIENT
Start: 2025-03-26 | End: 2025-03-26 | Stop reason: HOSPADM

## 2025-03-25 RX ORDER — ONDANSETRON 4 MG/1
4 TABLET, ORALLY DISINTEGRATING ORAL EVERY 6 HOURS PRN
Status: DISCONTINUED | OUTPATIENT
Start: 2025-03-25 | End: 2025-03-26 | Stop reason: HOSPADM

## 2025-03-25 RX ORDER — METOPROLOL TARTRATE 1 MG/ML
5 INJECTION, SOLUTION INTRAVENOUS
Status: DISCONTINUED | OUTPATIENT
Start: 2025-03-25 | End: 2025-03-26 | Stop reason: HOSPADM

## 2025-03-25 RX ORDER — BISACODYL 10 MG
10 SUPPOSITORY, RECTAL RECTAL DAILY PRN
Status: DISCONTINUED | OUTPATIENT
Start: 2025-03-25 | End: 2025-03-26 | Stop reason: HOSPADM

## 2025-03-25 RX ORDER — PANTOPRAZOLE SODIUM 40 MG/1
40 TABLET, DELAYED RELEASE ORAL
Status: DISCONTINUED | OUTPATIENT
Start: 2025-03-26 | End: 2025-03-26 | Stop reason: HOSPADM

## 2025-03-25 RX ORDER — ATORVASTATIN CALCIUM 10 MG/1
10 TABLET, FILM COATED ORAL DAILY
Status: DISCONTINUED | OUTPATIENT
Start: 2025-03-26 | End: 2025-03-26 | Stop reason: HOSPADM

## 2025-03-25 RX ORDER — LISINOPRIL 5 MG/1
10 TABLET ORAL DAILY
Status: DISCONTINUED | OUTPATIENT
Start: 2025-03-26 | End: 2025-03-26 | Stop reason: HOSPADM

## 2025-03-25 RX ORDER — METOPROLOL TARTRATE 25 MG/1
25 TABLET, FILM COATED ORAL EVERY 8 HOURS
Status: DISCONTINUED | OUTPATIENT
Start: 2025-03-25 | End: 2025-03-25

## 2025-03-25 RX ORDER — NITROGLYCERIN 0.4 MG/1
0.8 TABLET SUBLINGUAL
Status: COMPLETED | OUTPATIENT
Start: 2025-03-26 | End: 2025-03-26

## 2025-03-25 RX ORDER — INSULIN LISPRO 100 [IU]/ML
2-9 INJECTION, SOLUTION INTRAVENOUS; SUBCUTANEOUS
Status: DISCONTINUED | OUTPATIENT
Start: 2025-03-25 | End: 2025-03-26 | Stop reason: HOSPADM

## 2025-03-25 RX ORDER — ONDANSETRON 2 MG/ML
4 INJECTION INTRAMUSCULAR; INTRAVENOUS EVERY 6 HOURS PRN
Status: DISCONTINUED | OUTPATIENT
Start: 2025-03-25 | End: 2025-03-26 | Stop reason: HOSPADM

## 2025-03-25 RX ORDER — NITROGLYCERIN 0.4 MG/1
0.8 TABLET SUBLINGUAL
Status: DISCONTINUED | OUTPATIENT
Start: 2025-03-25 | End: 2025-03-25 | Stop reason: SDUPTHER

## 2025-03-25 RX ADMIN — METOPROLOL TARTRATE 25 MG: 25 TABLET, FILM COATED ORAL at 15:12

## 2025-03-25 RX ADMIN — METOPROLOL TARTRATE 25 MG: 25 TABLET, FILM COATED ORAL at 23:20

## 2025-03-25 RX ADMIN — ASPIRIN 81 MG CHEWABLE TABLET 324 MG: 81 TABLET CHEWABLE at 13:42

## 2025-03-25 NOTE — ED PROVIDER NOTES
Subjective   History of Present Illness  49-year-old female states she was sitting at her desk at work today and started feeling her heart racing with associated diaphoresis and lightheadedness.  She describes associated chest pressure.  States it lasted a few minutes and then resolved.  Notes that she did have some persistent mild tachycardia since that time.  She reports no ongoing chest pain.  Review of Systems    Past Medical History:   Diagnosis Date    Diabetes mellitus     GERD (gastroesophageal reflux disease)     Hyperlipidemia     Hypertension        No Known Allergies    Past Surgical History:   Procedure Laterality Date    CHOLECYSTECTOMY      COLONOSCOPY      ENDOSCOPY      HYSTERECTOMY      TONSILLECTOMY      UPPER ENDOSCOPIC ULTRASOUND W/ FNA N/A 12/30/2024    Procedure: ENDOSCOPIC ULTRASOUND WITH ESOPHAGOGASTRODUODENOSCOPY WITH BIOPSY;  Surgeon: Derrell Mandujano MD;  Location: [facility] ENDOSCOPY;  Service: Gastroenterology;  Laterality: N/A;  GASTRIC NODULE       No family history on file.  Patient reports mother with heart disease  Social History     Socioeconomic History    Marital status:    Tobacco Use    Smoking status: Never    Smokeless tobacco: Never   Vaping Use    Vaping status: Never Used   Substance and Sexual Activity    Alcohol use: Not Currently    Drug use: Never    Sexual activity: Defer     Prior to Admission medications    Medication Sig Start Date End Date Taking? Authorizing Provider   aspirin 81 MG EC tablet Take 1 tablet by mouth Daily.    Jean Altamirano MD   atorvastatin (LIPITOR) 10 MG tablet Take 1 tablet by mouth Daily.    Jean Altamirano MD   dapagliflozin Propanediol (Farxiga) 10 MG tablet Take  by mouth.    Jean Altamirano MD   esomeprazole (nexIUM) 20 MG capsule Take 1 capsule by mouth Every Morning Before Breakfast.    Jean Altamirano MD   lisinopril (PRINIVIL,ZESTRIL) 20 MG tablet Take 1 tablet by mouth Daily.    Jean Altamirano MD  "  metFORMIN (GLUCOPHAGE) 500 MG tablet Take 4 tablets by mouth Daily With Breakfast.    ProviderJean MD   multivitamin with minerals tablet tablet Take 1 tablet by mouth Daily.    ProviderJean MD   Plecanatide (Trulance) 3 MG tablet Take  by mouth.    ProviderJean MD     /91   Pulse 89   Temp 98.5 °F (36.9 °C) (Oral)   Resp 17   Ht 157.5 cm (62\")   Wt 78 kg (171 lb 15.3 oz)   SpO2 96%   BMI 31.45 kg/m²         Objective   Physical Exam  General: Well-developed well-appearing, no acute distress, alert and appropriate  Eyes:  sclera nonicteric  HEENT: Mucous membranes moist, no mucosal swelling  Neck: Supple, no nuchal rigidity,   Respirations: Respirations nonlabored, equal breath sounds bilaterally, clear lungs  Heart regular rate and rhythm, no murmurs rubs or gallops,   Abdomen soft nontender nondistended, no hepatosplenomegaly,   Extremities no clubbing cyanosis or edema, calves are symmetric and nontender  Neuro cranial nerves grossly intact, no focal limb deficits  Psych oriented, pleasant affect  Skin no rash, brisk cap refill  Procedures           ED Course      Results for orders placed or performed during the hospital encounter of 03/25/25   ECG 12 Lead Chest Pain    Collection Time: 03/25/25  1:18 PM   Result Value Ref Range    QT Interval 334 ms    QTC Interval 421 ms   Comprehensive Metabolic Panel    Collection Time: 03/25/25  1:45 PM    Specimen: Blood   Result Value Ref Range    Glucose 91 65 - 99 mg/dL    BUN 9 6 - 20 mg/dL    Creatinine 0.76 0.57 - 1.00 mg/dL    Sodium 140 136 - 145 mmol/L    Potassium 4.2 3.5 - 5.2 mmol/L    Chloride 103 98 - 107 mmol/L    CO2 22.3 22.0 - 29.0 mmol/L    Calcium 9.6 8.6 - 10.5 mg/dL    Total Protein 7.2 6.0 - 8.5 g/dL    Albumin 4.6 3.5 - 5.2 g/dL    ALT (SGPT) 31 1 - 33 U/L    AST (SGOT) 23 1 - 32 U/L    Alkaline Phosphatase 104 39 - 117 U/L    Total Bilirubin 0.9 0.0 - 1.2 mg/dL    Globulin 2.6 gm/dL    A/G Ratio 1.8 g/dL    " BUN/Creatinine Ratio 11.8 7.0 - 25.0    Anion Gap 14.7 5.0 - 15.0 mmol/L    eGFR 95.0 >60.0 mL/min/1.73   High Sensitivity Troponin T    Collection Time: 03/25/25  1:45 PM    Specimen: Blood   Result Value Ref Range    HS Troponin T 13 <14 ng/L   CBC Auto Differential    Collection Time: 03/25/25  1:45 PM    Specimen: Blood   Result Value Ref Range    WBC 8.92 3.40 - 10.80 10*3/mm3    RBC 5.37 (H) 3.77 - 5.28 10*6/mm3    Hemoglobin 15.4 12.0 - 15.9 g/dL    Hematocrit 47.4 (H) 34.0 - 46.6 %    MCV 88.3 79.0 - 97.0 fL    MCH 28.7 26.6 - 33.0 pg    MCHC 32.5 31.5 - 35.7 g/dL    RDW 13.0 12.3 - 15.4 %    RDW-SD 42.2 37.0 - 54.0 fl    MPV 9.2 6.0 - 12.0 fL    Platelets 311 140 - 450 10*3/mm3    Neutrophil % 58.7 42.7 - 76.0 %    Lymphocyte % 28.6 19.6 - 45.3 %    Monocyte % 7.7 5.0 - 12.0 %    Eosinophil % 3.8 0.3 - 6.2 %    Basophil % 0.9 0.0 - 1.5 %    Immature Grans % 0.3 0.0 - 0.5 %    Neutrophils, Absolute 5.23 1.70 - 7.00 10*3/mm3    Lymphocytes, Absolute 2.55 0.70 - 3.10 10*3/mm3    Monocytes, Absolute 0.69 0.10 - 0.90 10*3/mm3    Eosinophils, Absolute 0.34 0.00 - 0.40 10*3/mm3    Basophils, Absolute 0.08 0.00 - 0.20 10*3/mm3    Immature Grans, Absolute 0.03 0.00 - 0.05 10*3/mm3    nRBC 0.0 0.0 - 0.2 /100 WBC   D-dimer, Quantitative    Collection Time: 03/25/25  1:45 PM    Specimen: Blood   Result Value Ref Range    D-Dimer, Quantitative <0.27 0.00 - 0.51 MCGFEU/mL   TSH Rfx On Abnormal To Free T4    Collection Time: 03/25/25  1:45 PM    Specimen: Blood   Result Value Ref Range    TSH 1.250 0.270 - 4.200 uIU/mL   Magnesium    Collection Time: 03/25/25  1:45 PM    Specimen: Blood   Result Value Ref Range    Magnesium 2.2 1.6 - 2.6 mg/dL   Cortisol    Collection Time: 03/25/25  1:45 PM    Specimen: Blood   Result Value Ref Range    Cortisol 7.53   mcg/dL   Green Top (Gel)    Collection Time: 03/25/25  1:45 PM   Result Value Ref Range    Extra Tube Hold for add-ons.    Lavender Top    Collection Time: 03/25/25  1:45  PM   Result Value Ref Range    Extra Tube hold for add-on    Gold Top - SST    Collection Time: 03/25/25  1:45 PM   Result Value Ref Range    Extra Tube Hold for add-ons.    Light Blue Top    Collection Time: 03/25/25  1:45 PM   Result Value Ref Range    Extra Tube Hold for add-ons.    High Sensitivity Troponin T 1Hr    Collection Time: 03/25/25  3:17 PM    Specimen: Blood   Result Value Ref Range    HS Troponin T 14 (H) <14 ng/L    Troponin T Numeric Delta 1 Abnormal if >/=3 ng/L     XR Chest 1 View  Result Date: 3/25/2025  Impression: An acute pulmonary process is not apparent. Electronically Signed: Sam Moses MD  3/25/2025 2:15 PM EDT  Workstation ID: [id number]                HEART Score: 4   Shared Decision Making  I discussed the findings with the patient/patient representative who is in agreement with the treatment plan and the final disposition.  Risks and benefits of discharge and/or observation/admission were discussed: Yes                                      Medical Decision Making  Differential diagnosis including acute coronary syndrome, dysrhythmia, pulmonary embolus, congestive heart failure, dissection, pneumonia, pneumothorax    D-dimer screen normal, pulmonary embolus felt to be unlikely, she is having no back pain or neurodeficits to suggest dissection.  She does have a moderate heart score.  Patient was seen by Dr. Sam during the emergency room course ordered additional cardiac testing and recommends overnight monitoring and chest pain evaluation.  On reexamination patient is resting comfortably without chest pain or dyspnea and stable on the monitor.  She will be placed in hospital observation for further evaluation.  Was advised of findings and agreeable to the plan.    Problems Addressed:  Chest pain, unspecified type: complicated acute illness or injury    Amount and/or Complexity of Data Reviewed  Labs: ordered. Decision-making details documented in ED Course.     Details: D-dimer normal,  high-sensitivity troponin indeterminate range with normal delta, CBC normal, comprehensive metabolic panel essentially normal, TSH normal  Radiology: ordered and independent interpretation performed.     Details: My independent interpretation of chest x-ray image no pneumonia or congestive failure  ECG/medicine tests: ordered and independent interpretation performed.     Details: My EKG interpretation sinus rhythm, no acute ST or T wave abnormality, rate of 95    Risk  OTC drugs.  Prescription drug management.  Decision regarding hospitalization.        Final diagnoses:   Chest pain, unspecified type       ED Disposition  ED Disposition       ED Disposition   Decision to Admit    Condition   --    Comment   --               No follow-up provider specified.       Medication List      No changes were made to your prescriptions during this visit.            Nadeem Marie MD  03/25/25 7115

## 2025-03-25 NOTE — CONSULTS
Referring Provider: Dr. Marie    Attending: Nadeem Marie MD    Reason for Consultation:    Palpitation  Chest discomfort  Shortness of breath  Diaphoresis    Chief complaint:    Chest pain  Palpitation       History of present illness:     Patient is 49-year-old white female whose past medical history is significant for hypertension, hyperlipidemia, diabetes mellitus, who is admitted with symptom of chest pain and palpitation.    Patient reports he was at her work when she started having symptom of palpitation.  She described this as a racing of the heart.  She noticed her heart rate was 100 to 190 bpm.  It was associated with chest pain and tightness.  Patient also felt short of breath and diaphoretic.  Patient initially called PCP but they recommended to go to urgent care center.  In the urgent care center her heart rate was elevated however the EKG available to me showed sinus tachycardia.  Patient was transferred to [facility].  Patient is noted to have sinus tachycardia with heart rate of 127 bpm.  At the time of my examination patient had heart rate of 94 to 96 bpm and it was sinus rhythm.  Patient was chest pain-free.    Patient had few episode of such palpitation and chest pain in the past.  Patient is diabetic.  No previous history of CAD, PCI or MI.        Review of Systems  Review of Systems   Constitutional: Negative for chills and fever.   HENT:  Negative for ear discharge and nosebleeds.    Eyes:  Negative for discharge and redness.   Cardiovascular:  Positive for chest pain. Negative for orthopnea, palpitations, paroxysmal nocturnal dyspnea and syncope.   Respiratory:  Positive for shortness of breath. Negative for cough and wheezing.    Endocrine: Negative for heat intolerance.   Skin:  Negative for rash.   Musculoskeletal:  Negative for arthritis and myalgias.   Gastrointestinal:  Negative for abdominal pain, melena, nausea and vomiting.   Genitourinary:  Negative for dysuria and  "hematuria.   Neurological:  Negative for dizziness, light-headedness, numbness and tremors.   Psychiatric/Behavioral:  Negative for depression. The patient is not nervous/anxious.        Past Medical History  Past Medical History:   Diagnosis Date    Diabetes mellitus     GERD (gastroesophageal reflux disease)     Hyperlipidemia     Hypertension     and   Past Surgical History:   Procedure Laterality Date    CHOLECYSTECTOMY      COLONOSCOPY      ENDOSCOPY      HYSTERECTOMY      TONSILLECTOMY      UPPER ENDOSCOPIC ULTRASOUND W/ FNA N/A 12/30/2024    Procedure: ENDOSCOPIC ULTRASOUND WITH ESOPHAGOGASTRODUODENOSCOPY WITH BIOPSY;  Surgeon: Derrell Mandujano MD;  Location: [facility] ENDOSCOPY;  Service: Gastroenterology;  Laterality: N/A;  GASTRIC NODULE       Family History  No family history on file.    Social History  Social History     Socioeconomic History    Marital status:    Tobacco Use    Smoking status: Never    Smokeless tobacco: Never   Vaping Use    Vaping status: Never Used   Substance and Sexual Activity    Alcohol use: Not Currently    Drug use: Never    Sexual activity: Defer       Objective     Physical Exam:    Vital Signs  Vitals:    03/25/25 1304   BP: 129/89   Pulse: 104   Resp: 17   Temp: 98.5 °F (36.9 °C)   TempSrc: Oral   SpO2: 95%   Weight: 78 kg (171 lb 15.3 oz)   Height: 157.5 cm (62\")       Weight  Flowsheet Rows      Flowsheet Row First Filed Value   Admission Height 157.5 cm (62\") Documented at 03/25/2025 1304   Admission Weight 78 kg (171 lb 15.3 oz) Documented at 03/25/2025 1304             Constitutional:       Appearance: Well-developed.   Eyes:      General: No scleral icterus.        Right eye: No discharge.   HENT:      Head: Normocephalic and atraumatic.   Neck:      Thyroid: No thyromegaly.      Lymphadenopathy: No cervical adenopathy.   Pulmonary:      Effort: Pulmonary effort is normal. No respiratory distress.      Breath sounds: Normal breath sounds. No wheezing. No rales. "   Cardiovascular:      Normal rate. Regular rhythm.      No gallop.    Edema:     Peripheral edema absent.   Abdominal:      Tenderness: There is no abdominal tenderness.   Skin:     Findings: No erythema or rash.   Neurological:      Mental Status: Alert and oriented to person, place, and time.         Results Review:  Lab Results (last 24 hours)       Procedure Component Value Units Date/Time    TSH Rfx On Abnormal To Free T4 [[id number]]  (Normal) Collected: 03/25/25 1345    Specimen: Blood Updated: 03/25/25 1447     TSH 1.250 uIU/mL     Magnesium [[id number]]  (Normal) Collected: 03/25/25 1345    Specimen: Blood Updated: 03/25/25 1447     Magnesium 2.2 mg/dL     Comprehensive Metabolic Panel [[id number]] Collected: 03/25/25 1345    Specimen: Blood Updated: 03/25/25 1419     Glucose 91 mg/dL      BUN 9 mg/dL      Creatinine 0.76 mg/dL      Sodium 140 mmol/L      Potassium 4.2 mmol/L      Chloride 103 mmol/L      CO2 22.3 mmol/L      Calcium 9.6 mg/dL      Total Protein 7.2 g/dL      Albumin 4.6 g/dL      ALT (SGPT) 31 U/L      AST (SGOT) 23 U/L      Alkaline Phosphatase 104 U/L      Total Bilirubin 0.9 mg/dL      Globulin 2.6 gm/dL      A/G Ratio 1.8 g/dL      BUN/Creatinine Ratio 11.8     Anion Gap 14.7 mmol/L      eGFR 95.0 mL/min/1.73     Narrative:      GFR Categories in Chronic Kidney Disease (CKD)      GFR Category          GFR (mL/min/1.73)    Interpretation  G1                     90 or greater         Normal or high (1)  G2                      60-89                Mild decrease (1)  G3a                   45-59                Mild to moderate decrease  G3b                   30-44                Moderate to severe decrease  G4                    15-29                Severe decrease  G5                    14 or less           Kidney failure          (1)In the absence of evidence of kidney disease, neither GFR category G1 or G2 fulfill the criteria for CKD.    eGFR calculation 2021 CKD-EPI creatinine equation,  "which does not include race as a factor    High Sensitivity Troponin T [[id number]]  (Normal) Collected: 03/25/25 1345    Specimen: Blood Updated: 03/25/25 1419     HS Troponin T 13 ng/L     Narrative:      High Sensitive Troponin T Reference Range:  <14.0 ng/L- Negative Female for AMI  <22.0 ng/L- Negative Male for AMI  >=14 - Abnormal Female indicating possible myocardial injury.  >=22 - Abnormal Male indicating possible myocardial injury.   Clinicians would have to utilize clinical acumen, EKG, Troponin, and serial changes to determine if it is an Acute Myocardial Infarction or myocardial injury due to an underlying chronic condition.         D-dimer, Quantitative [[id number]]  (Normal) Collected: 03/25/25 1345    Specimen: Blood Updated: 03/25/25 1419     D-Dimer, Quantitative <0.27 MCGFEU/mL     Narrative:      According to the assay 's published package insert, a normal (<0.50 MCGFEU/mL) D-dimer result in conjunction with a non-high clinical probability assessment, excludes deep vein thrombosis (DVT) and pulmonary embolism (PE) with high sensitivity.    D-dimer values increase with age and this can make VTE exclusion of an older population difficult. To address this, the [employer], based on best available evidence and recent guidelines, recommends that clinicians use age-adjusted D-dimer thresholds in patients greater than 50 years of age with: a) a low probability of PE who do not meet all Pulmonary Embolism Rule Out Criteria, or b) in those with intermediate probability of PE.   The formula for an age-adjusted D-dimer cut-off is \"age/100\".  For example, a 60 year old patient would have an age-adjusted cut-off of 0.60 MCGFEU/mL and an 80 year old 0.80 MCGFEU/mL.    [place] Draw [[id number]] Collected: 03/25/25 1345    Specimen: Blood Updated: 03/25/25 1401    Narrative:      The following orders were created for panel order [place] Draw.  Procedure                               " Abnormality         Status                     ---------                               -----------         ------                     Green Top (Gel)[[id number]]                                  Final result               Lavender Top[[id number]]                                     Final result               Gold Top - SST[[id number]]                                   Final result               Light Blue Top[[id number]]                                   Final result                 Please view results for these tests on the individual orders.    Light Blue Top [[id number]] Collected: 03/25/25 1345    Specimen: Blood Updated: 03/25/25 1401     Extra Tube Hold for add-ons.     Comment: Auto resulted       Green Top (Gel) [[id number]] Collected: 03/25/25 1345    Specimen: Blood Updated: 03/25/25 1401     Extra Tube Hold for add-ons.     Comment: Auto resulted.       Lavender Top [[id number]] Collected: 03/25/25 1345    Specimen: Blood Updated: 03/25/25 1401     Extra Tube hold for add-on     Comment: Auto resulted       Gold Top - SST [[id number]] Collected: 03/25/25 1345    Specimen: Blood Updated: 03/25/25 1401     Extra Tube Hold for add-ons.     Comment: Auto resulted.       CBC & Differential [[id number]]  (Abnormal) Collected: 03/25/25 1345    Specimen: Blood Updated: 03/25/25 1354    Narrative:      The following orders were created for panel order CBC & Differential.  Procedure                               Abnormality         Status                     ---------                               -----------         ------                     CBC Auto Differential[[id number]]        Abnormal            Final result                 Please view results for these tests on the individual orders.    CBC Auto Differential [[id number]]  (Abnormal) Collected: 03/25/25 1345    Specimen: Blood Updated: 03/25/25 1354     WBC 8.92 10*3/mm3      RBC 5.37 10*6/mm3      Hemoglobin 15.4 g/dL      Hematocrit 47.4 %      MCV 88.3 fL      MCH  28.7 pg      MCHC 32.5 g/dL      RDW 13.0 %      RDW-SD 42.2 fl      MPV 9.2 fL      Platelets 311 10*3/mm3      Neutrophil % 58.7 %      Lymphocyte % 28.6 %      Monocyte % 7.7 %      Eosinophil % 3.8 %      Basophil % 0.9 %      Immature Grans % 0.3 %      Neutrophils, Absolute 5.23 10*3/mm3      Lymphocytes, Absolute 2.55 10*3/mm3      Monocytes, Absolute 0.69 10*3/mm3      Eosinophils, Absolute 0.34 10*3/mm3      Basophils, Absolute 0.08 10*3/mm3      Immature Grans, Absolute 0.03 10*3/mm3      nRBC 0.0 /100 WBC           Imaging Results (Last 72 Hours)       Procedure Component Value Units Date/Time    XR Chest 1 View [[id number]] Collected: 03/25/25 1414     Updated: 03/25/25 1417    Narrative:      XR CHEST 1 VW    Date of Exam: 3/25/2025 1:51 PM EDT    Indication: Chest Pain Protocol  Chest Pain Protocol    Comparison: August 2, 2020    Findings:  The heart is not definitely enlarged. The lungs seem clear. There are no pleural effusions.      Impression:      Impression:  An acute pulmonary process is not apparent.        Electronically Signed: Sam Moses MD    3/25/2025 2:15 PM EDT    Workstation ID: [id number]          ECG 12 Lead Chest Pain   Preliminary Result   HEART RATE=95  bpm   RR Wtyfnoam=296  ms   WA Yaosrftq=409  ms   P Horizontal Axis=13  deg   P Front Axis=54  deg   QRSD Interval=70  ms   QT Gtlleuuk=584  ms   DRiA=052  ms   QRS Axis=-15  deg   T Wave Axis=60  deg   - NORMAL ECG -   Sinus rhythm   When compared with ECG of 02-Aug-2020 03:49:12,   No significant change   Date and Time of Study:2025-03-25 13:18:40      ECG 12 Lead Chest Pain    (Results Pending)     CBC    Results from last 7 days   Lab Units 03/25/25  1345   WBC 10*3/mm3 8.92   HEMOGLOBIN g/dL 15.4   PLATELETS 10*3/mm3 311     BMP   Results from last 7 days   Lab Units 03/25/25  1345   SODIUM mmol/L 140   POTASSIUM mmol/L 4.2   CHLORIDE mmol/L 103   CO2 mmol/L 22.3   BUN mg/dL 9   CREATININE mg/dL 0.76   GLUCOSE mg/dL 91    MAGNESIUM mg/dL 2.2     CMP   Results from last 7 days   Lab Units 03/25/25  1345   SODIUM mmol/L 140   POTASSIUM mmol/L 4.2   CHLORIDE mmol/L 103   CO2 mmol/L 22.3   BUN mg/dL 9   CREATININE mg/dL 0.76   GLUCOSE mg/dL 91   ALBUMIN g/dL 4.6   BILIRUBIN mg/dL 0.9   ALK PHOS U/L 104   AST (SGOT) U/L 23   ALT (SGPT) U/L 31     Medication Review  Scheduled Meds:metoprolol tartrate, 25 mg, Oral, Q6H      Continuous Infusions:   PRN Meds:.  sodium chloride    Assessment:      * No active hospital problems. *        Plan:    MDM:    1.  Palpitation:    Patient is having palpitation so far the sinus tachycardia is demonstrated.  No other arrhythmia like atrial fibrillation or SVT.  I would start Lopressor 25 mg every 6.  I would recommend proceed with MCOT on discharge.    2.  Sinus tachycardia:    Patient should be excluded for anemia and hyperthyroidism.  I would also consider in the future to exclude hypercortisolism as well as pheochromocytoma.  ACTH stimulation test should be done.    3.  Hypertension:    Start Lopressor.    4.  Diabetes mellitus:    Patient is on metformin and Jardiance.    5.  Chest pain:    Proceed with stress test and echocardiogram    I discussed the patient's findings and my recommendations with patient and her family    Damion Sam MD  03/25/25  14:47 EDT

## 2025-03-25 NOTE — CASE MANAGEMENT/SOCIAL WORK
Discharge Planning Assessment  [facility]     Patient Name: Alejandra Gilbert  MRN: [id number]  Today's Date: 3/25/2025    Admit Date: 3/25/2025    Plan: From Home with Family   Discharge Needs Assessment       Row Name 03/25/25 1717       Living Environment    People in Home child(fuad), dependent;child(fuad), adult    Current Living Arrangements home    Potentially Unsafe Housing Conditions none    In the past 12 months has the electric, gas, oil, or water company threatened to shut off services in your home? No    Primary Care Provided by self    Provides Primary Care For child(fuad)    Family Caregiver if Needed child(fuad), adult    Family Caregiver Names Daughter-Stacey; Mother-Jemma    Quality of Family Relationships supportive    Able to Return to Prior Arrangements yes    Living Arrangement Comments Home with Family       Resource/Environmental Concerns    Resource/Environmental Concerns none    Transportation Concerns none       Transportation Needs    In the past 12 months, has lack of transportation kept you from medical appointments or from getting medications? no    In the past 12 months, has lack of transportation kept you from meetings, work, or from getting things needed for daily living? No       Food Insecurity    Within the past 12 months, you worried that your food would run out before you got the money to buy more. Never true    Within the past 12 months, the food you bought just didn't last and you didn't have money to get more. Never true       Transition Planning    Patient/Family Anticipates Transition to home    Patient/Family Anticipated Services at Transition none    Transportation Anticipated car, drives self;family or friend will provide       Discharge Needs Assessment    Readmission Within the Last 30 Days no previous admission in last 30 days    Equipment Currently Used at Home none    Concerns to be Addressed discharge planning    Do you want help finding or keeping work or a job? I do not  need or want help    Do you want help with school or training? For example, starting or completing job training or getting a high school diploma, GED or equivalent No    Anticipated Changes Related to Illness none    Provided Post Acute Provider List? N/A    Provided Post Acute Provider Quality & Resource List? N/A    Offered/Gave Vendor List no                   Discharge Plan       Row Name 03/25/25 1718       Plan    Plan From Home with Family    Patient/Family in Agreement with Plan unable to assess    Plan Comments Met with patient at bedside, confirmed PCP and Pharm. States she is independent with ADLs and IADLs, denies financial concerns and family will provide dc transportation. Discussed dc plan, declines HH or SNF needs and anticipates routine home.                        DC Barriers: Cardiology consult; Repeat Labs and EKG                       Demographic Summary       Row Name 03/25/25 1711       General Information    Admission Type observation    Arrived From home    Referral Source emergency department    Reason for Consult discharge planning    Preferred Language English       Contact Information    Permission Granted to Share Info With                    Functional Status       Row Name 03/25/25 1716       Functional Status    Usual Activity Tolerance excellent    Current Activity Tolerance good       Physical Activity    On average, how many days per week do you engage in moderate to strenuous exercise (like a brisk walk)? 0 days    On average, how many minutes do you engage in exercise at this level? 0 min    Number of minutes of exercise per week 0       Assessment of Health Literacy    How often do you have someone help you read hospital materials? Never    How often do you have problems learning about your medical condition because of difficulty understanding written information? Occasionally    How often do you have a problem understanding what is told to you about your medical  condition? Occasionally    How confident are you filling out medical forms by yourself? Extremely    Health Literacy Excellent       Functional Status, IADL    Medications independent    Meal Preparation independent    Housekeeping independent    Laundry independent    Shopping independent    If for any reason you need help with day-to-day activities such as bathing, preparing meals, shopping, managing finances, etc., do you get the help you need? I don't need any help       Mental Status    General Appearance WDL WDL       Mental Status Summary    Recent Changes in Mental Status/Cognitive Functioning no changes             Met with patient in room wearing PPE: mask    Maintained distance greater than six feet and spent less than 15 minutes in the room    Aida Bojorquez RN    Phone [phone]  Fax [phone]

## 2025-03-26 ENCOUNTER — APPOINTMENT (OUTPATIENT)
Dept: CT IMAGING | Facility: HOSPITAL | Age: 50
End: 2025-03-26
Payer: COMMERCIAL

## 2025-03-26 ENCOUNTER — APPOINTMENT (OUTPATIENT)
Dept: RESPIRATORY THERAPY | Facility: HOSPITAL | Age: 50
End: 2025-03-26
Payer: COMMERCIAL

## 2025-03-26 VITALS
HEIGHT: 62 IN | RESPIRATION RATE: 15 BRPM | HEART RATE: 73 BPM | SYSTOLIC BLOOD PRESSURE: 111 MMHG | TEMPERATURE: 97.5 F | OXYGEN SATURATION: 94 % | BODY MASS INDEX: 31.44 KG/M2 | DIASTOLIC BLOOD PRESSURE: 77 MMHG | WEIGHT: 170.86 LBS

## 2025-03-26 LAB
AORTIC DIMENSIONLESS INDEX: 0.73 (DI)
AV MEAN PRESS GRAD SYS DOP V1V2: 3 MMHG
AV VMAX SYS DOP: 124 CM/SEC
BH CV ECHO LEFT VENTRICLE GLOBAL LONGITUDINAL STRAIN: -17.1 %
BH CV ECHO MEAS - ACS: 1.8 CM
BH CV ECHO MEAS - AO MAX PG: 6.2 MMHG
BH CV ECHO MEAS - AO V2 VTI: 23.2 CM
BH CV ECHO MEAS - AVA(I,D): 2.08 CM2
BH CV ECHO MEAS - EDV(CUBED): 68.9 ML
BH CV ECHO MEAS - EDV(MOD-SP4): 61.7 ML
BH CV ECHO MEAS - EF(MOD-SP4): 63 %
BH CV ECHO MEAS - ESV(CUBED): 19.7 ML
BH CV ECHO MEAS - ESV(MOD-SP4): 22.8 ML
BH CV ECHO MEAS - FS: 34.1 %
BH CV ECHO MEAS - IVS/LVPW: 1 CM
BH CV ECHO MEAS - IVSD: 0.8 CM
BH CV ECHO MEAS - LA DIMENSION: 3.8 CM
BH CV ECHO MEAS - LAT PEAK E' VEL: 12.7 CM/SEC
BH CV ECHO MEAS - LV DIASTOLIC VOL/BSA (35-75): 34.5 CM2
BH CV ECHO MEAS - LV MASS(C)D: 97.3 GRAMS
BH CV ECHO MEAS - LV MAX PG: 3.5 MMHG
BH CV ECHO MEAS - LV MEAN PG: 2 MMHG
BH CV ECHO MEAS - LV SYSTOLIC VOL/BSA (12-30): 12.7 CM2
BH CV ECHO MEAS - LV V1 MAX: 93.3 CM/SEC
BH CV ECHO MEAS - LV V1 VTI: 17 CM
BH CV ECHO MEAS - LVIDD: 4.1 CM
BH CV ECHO MEAS - LVIDS: 2.7 CM
BH CV ECHO MEAS - LVOT AREA: 2.8 CM2
BH CV ECHO MEAS - LVOT DIAM: 1.9 CM
BH CV ECHO MEAS - LVPWD: 0.8 CM
BH CV ECHO MEAS - MED PEAK E' VEL: 10.8 CM/SEC
BH CV ECHO MEAS - MV A MAX VEL: 56.7 CM/SEC
BH CV ECHO MEAS - MV DEC SLOPE: 551 CM/SEC2
BH CV ECHO MEAS - MV DEC TIME: 0.15 SEC
BH CV ECHO MEAS - MV E MAX VEL: 90.2 CM/SEC
BH CV ECHO MEAS - MV E/A: 1.59
BH CV ECHO MEAS - MV MAX PG: 3.1 MMHG
BH CV ECHO MEAS - MV MEAN PG: 1 MMHG
BH CV ECHO MEAS - MV P1/2T: 45.8 MSEC
BH CV ECHO MEAS - MV V2 VTI: 17.2 CM
BH CV ECHO MEAS - MVA(P1/2T): 4.8 CM2
BH CV ECHO MEAS - MVA(VTI): 2.8 CM2
BH CV ECHO MEAS - PA ACC TIME: 0.1 SEC
BH CV ECHO MEAS - PA V2 MAX: 78.4 CM/SEC
BH CV ECHO MEAS - RAP SYSTOLE: 3 MMHG
BH CV ECHO MEAS - RV MAX PG: 1.85 MMHG
BH CV ECHO MEAS - RV V1 MAX: 68 CM/SEC
BH CV ECHO MEAS - RV V1 VTI: 13.7 CM
BH CV ECHO MEAS - RVDD: 2.5 CM
BH CV ECHO MEAS - RVSP: 17.6 MMHG
BH CV ECHO MEAS - SV(LVOT): 48.2 ML
BH CV ECHO MEAS - SV(MOD-SP4): 38.9 ML
BH CV ECHO MEAS - SVI(LVOT): 27 ML/M2
BH CV ECHO MEAS - SVI(MOD-SP4): 21.8 ML/M2
BH CV ECHO MEAS - TAPSE (>1.6): 1.91 CM
BH CV ECHO MEAS - TR MAX PG: 14.6 MMHG
BH CV ECHO MEAS - TR MAX VEL: 191 CM/SEC
BH CV ECHO MEASUREMENTS AVERAGE E/E' RATIO: 7.68
BH CV XLRA - TDI S': 15.1 CM/SEC
GLUCOSE BLDC GLUCOMTR-MCNC: 124 MG/DL (ref 70–105)
GLUCOSE BLDC GLUCOMTR-MCNC: 137 MG/DL (ref 70–105)
LEFT ATRIUM VOLUME INDEX: 17.8 ML/M2
LV EF BIPLANE MOD: 63 %
QT INTERVAL: 334 MS
QTC INTERVAL: 421 MS
SINUS: 2.8 CM

## 2025-03-26 PROCEDURE — 99214 OFFICE O/P EST MOD 30 MIN: CPT

## 2025-03-26 PROCEDURE — G0378 HOSPITAL OBSERVATION PER HR: HCPCS

## 2025-03-26 PROCEDURE — 25810000003 SODIUM CHLORIDE 0.9 % SOLUTION: Performed by: INTERNAL MEDICINE

## 2025-03-26 PROCEDURE — 75574 CT ANGIO HRT W/3D IMAGE: CPT

## 2025-03-26 PROCEDURE — 25510000001 IOPAMIDOL PER 1 ML: Performed by: EMERGENCY MEDICINE

## 2025-03-26 PROCEDURE — 75574 CT ANGIO HRT W/3D IMAGE: CPT | Performed by: STUDENT IN AN ORGANIZED HEALTH CARE EDUCATION/TRAINING PROGRAM

## 2025-03-26 PROCEDURE — 82948 REAGENT STRIP/BLOOD GLUCOSE: CPT | Performed by: NURSE PRACTITIONER

## 2025-03-26 RX ORDER — IOPAMIDOL 755 MG/ML
100 INJECTION, SOLUTION INTRAVASCULAR
Status: COMPLETED | OUTPATIENT
Start: 2025-03-26 | End: 2025-03-26

## 2025-03-26 RX ORDER — SODIUM CHLORIDE 9 MG/ML
100 INJECTION, SOLUTION INTRAVENOUS CONTINUOUS
Status: DISCONTINUED | OUTPATIENT
Start: 2025-03-26 | End: 2025-03-26 | Stop reason: HOSPADM

## 2025-03-26 RX ORDER — METOPROLOL SUCCINATE 25 MG/1
25 TABLET, EXTENDED RELEASE ORAL 2 TIMES DAILY
Qty: 60 TABLET | Refills: 2 | Status: SHIPPED | OUTPATIENT
Start: 2025-03-26

## 2025-03-26 RX ORDER — METOPROLOL TARTRATE 50 MG
100 TABLET ORAL ONCE
Status: COMPLETED | OUTPATIENT
Start: 2025-03-26 | End: 2025-03-26

## 2025-03-26 RX ADMIN — METOPROLOL TARTRATE 25 MG: 25 TABLET, FILM COATED ORAL at 14:08

## 2025-03-26 RX ADMIN — ACETAMINOPHEN 650 MG: 325 TABLET, FILM COATED ORAL at 14:08

## 2025-03-26 RX ADMIN — IOPAMIDOL 100 ML: 755 INJECTION, SOLUTION INTRAVENOUS at 11:17

## 2025-03-26 RX ADMIN — SODIUM CHLORIDE 250 ML: 9 INJECTION, SOLUTION INTRAVENOUS at 09:16

## 2025-03-26 RX ADMIN — Medication 10 ML: at 09:19

## 2025-03-26 RX ADMIN — LISINOPRIL 10 MG: 5 TABLET ORAL at 12:27

## 2025-03-26 RX ADMIN — METOPROLOL TARTRATE 25 MG: 25 TABLET, FILM COATED ORAL at 08:10

## 2025-03-26 RX ADMIN — PANTOPRAZOLE SODIUM 40 MG: 40 TABLET, DELAYED RELEASE ORAL at 05:50

## 2025-03-26 RX ADMIN — ATORVASTATIN CALCIUM 10 MG: 10 TABLET ORAL at 12:26

## 2025-03-26 RX ADMIN — METOPROLOL TARTRATE 100 MG: 50 TABLET, FILM COATED ORAL at 09:16

## 2025-03-26 RX ADMIN — CETIRIZINE HYDROCHLORIDE 10 MG: 10 TABLET, FILM COATED ORAL at 12:27

## 2025-03-26 RX ADMIN — ASPIRIN 81 MG: 81 TABLET, COATED ORAL at 12:26

## 2025-03-26 RX ADMIN — NITROGLYCERIN 0.8 MG: 0.4 TABLET SUBLINGUAL at 11:05

## 2025-03-26 RX ADMIN — SODIUM CHLORIDE 100 ML/HR: 9 INJECTION, SOLUTION INTRAVENOUS at 11:33

## 2025-03-26 NOTE — DISCHARGE SUMMARY
[facility]    Kiarra Schumacher MD    CHIEF COMPLAINT:     Chest pain    HISTORY OF PRESENT ILLNESS:    HPI    ED  49-year-old female states she was sitting at her desk at work today and started feeling her heart racing with associated diaphoresis and lightheadedness. She describes associated chest pressure. States it lasted a few minutes and then resolved. Notes that she did have some persistent mild tachycardia since that time. She reports no ongoing chest pain.      Observation 3/26/25  Pt concurs with er hpi. Cardiology consulted. Cta coronary pending. Echo pending. Awaiting labs to check cortisol.     Past Medical History:   Diagnosis Date    Diabetes mellitus     GERD (gastroesophageal reflux disease)     Hyperlipidemia     Hypertension      Past Surgical History:   Procedure Laterality Date    CHOLECYSTECTOMY      COLONOSCOPY      ENDOSCOPY      HYSTERECTOMY      TONSILLECTOMY      UPPER ENDOSCOPIC ULTRASOUND W/ FNA N/A 12/30/2024    Procedure: ENDOSCOPIC ULTRASOUND WITH ESOPHAGOGASTRODUODENOSCOPY WITH BIOPSY;  Surgeon: Derrell Mandujano MD;  Location: [facility] ENDOSCOPY;  Service: Gastroenterology;  Laterality: N/A;  GASTRIC NODULE     History reviewed. No pertinent family history.  Social History     Tobacco Use    Smoking status: Never    Smokeless tobacco: Never   Vaping Use    Vaping status: Never Used   Substance Use Topics    Alcohol use: Not Currently    Drug use: Never     Medications Prior to Admission   Medication Sig Dispense Refill Last Dose/Taking    aspirin 81 MG EC tablet Take 1 tablet by mouth Daily.   3/25/2025    atorvastatin (LIPITOR) 10 MG tablet Take 1 tablet by mouth Daily.   3/25/2025    dapagliflozin Propanediol (Farxiga) 10 MG tablet Take 10 mg by mouth Daily.   3/25/2025    esomeprazole (nexIUM) 20 MG capsule Take 1 capsule by mouth Every Morning Before Breakfast.   3/25/2025    levocetirizine (XYZAL) 5 MG tablet Take 1 tablet by mouth Daily.   3/25/2025     lisinopril (PRINIVIL,ZESTRIL) 10 MG tablet Take 1 tablet by mouth Daily.   3/25/2025    metFORMIN (GLUCOPHAGE) 500 MG tablet Take 4 tablets by mouth Daily With Breakfast.   3/25/2025    multivitamin with minerals tablet tablet Take 1 tablet by mouth Daily.   3/25/2025    Plecanatide (Trulance) 3 MG tablet Take 1 tablet by mouth Daily.   3/25/2025     Allergies:  Patient has no known allergies.    Immunization History   Administered Date(s) Administered    COVID-19 (PFIZER) Purple Cap Monovalent 05/27/2021, 12/16/2021           REVIEW OF SYSTEMS:    ROS    Cardiovascular:  Positive for chest pain and palpitations.   Neurological:  Positive for light-headedness.    Vital Signs  Temp:  [97.4 °F (36.3 °C)-98.1 °F (36.7 °C)] 97.5 °F (36.4 °C)  Heart Rate:  [66-93] 69  Resp:  [13-18] 15  BP: ()/(53-98) 111/77          Physical Exam:  Physical Exam    Constitutional:       Appearance: Normal appearance. She is obese.   Cardiovascular:      Rate and Rhythm: Normal rate and regular rhythm.   Pulmonary:      Effort: Pulmonary effort is normal.      Breath sounds: Normal breath sounds.   Abdominal:      Palpations: Abdomen is soft.   Skin:     General: Skin is warm and dry.   Neurological:      General: No focal deficit present.      Mental Status: She is alert and oriented to person, place, and time. Mental status is at baseline.   Psychiatric:         Mood and Affect: Mood normal.         Behavior: Behavior normal.     Emotional Behavior:    wnl   Debilities:   None      Results Review:    I reviewed the patient's new clinical results.  Lab Results (most recent)       Procedure Component Value Units Date/Time    POC Glucose 4x Daily Before Meals & at Bedtime [[id number]]  (Abnormal) Collected: 03/26/25 1156    Specimen: Blood Updated: 03/26/25 1158     Glucose 124 mg/dL      Comment: Serial Number: [id number]:  [id number]       POC Glucose 4x Daily Before Meals & at Bedtime [[id number]]  (Abnormal) Collected:  03/26/25 0751    Specimen: Blood Updated: 03/26/25 0753     Glucose 137 mg/dL      Comment: Serial Number: [id number]:  [id number]       Hemoglobin A1c [[id number]]  (Abnormal) Collected: 03/25/25 1345    Specimen: Blood Updated: 03/25/25 1657     Hemoglobin A1C 6.90 %     High Sensitivity Troponin T 1Hr [[id number]]  (Abnormal) Collected: 03/25/25 1517    Specimen: Blood Updated: 03/25/25 1544     HS Troponin T 14 ng/L      Troponin T Numeric Delta 1 ng/L     Narrative:      High Sensitive Troponin T Reference Range:  <14.0 ng/L- Negative Female for AMI  <22.0 ng/L- Negative Male for AMI  >=14 - Abnormal Female indicating possible myocardial injury.  >=22 - Abnormal Male indicating possible myocardial injury.   Clinicians would have to utilize clinical acumen, EKG, Troponin, and serial changes to determine if it is an Acute Myocardial Infarction or myocardial injury due to an underlying chronic condition.         Cortisol [[id number]] Collected: 03/25/25 1345    Specimen: Blood Updated: 03/25/25 1523     Cortisol 7.53 mcg/dL     Narrative:      Cortisol Reference Ranges:    Cortisol 6AM - 10AM Range: 6.02-18.40 mcg/dl  Cortisol 4PM - 8PM Range: 2.68-10.50 mcg/dl      Results may be falsely increased if patient taking Biotin.      TSH Rfx On Abnormal To Free T4 [[id number]]  (Normal) Collected: 03/25/25 1345    Specimen: Blood Updated: 03/25/25 1447     TSH 1.250 uIU/mL     Magnesium [[id number]]  (Normal) Collected: 03/25/25 1345    Specimen: Blood Updated: 03/25/25 1447     Magnesium 2.2 mg/dL     Comprehensive Metabolic Panel [[id number]] Collected: 03/25/25 1345    Specimen: Blood Updated: 03/25/25 1419     Glucose 91 mg/dL      BUN 9 mg/dL      Creatinine 0.76 mg/dL      Sodium 140 mmol/L      Potassium 4.2 mmol/L      Chloride 103 mmol/L      CO2 22.3 mmol/L      Calcium 9.6 mg/dL      Total Protein 7.2 g/dL      Albumin 4.6 g/dL      ALT (SGPT) 31 U/L      AST (SGOT) 23 U/L      Alkaline Phosphatase 104 U/L       Total Bilirubin 0.9 mg/dL      Globulin 2.6 gm/dL      A/G Ratio 1.8 g/dL      BUN/Creatinine Ratio 11.8     Anion Gap 14.7 mmol/L      eGFR 95.0 mL/min/1.73     Narrative:      GFR Categories in Chronic Kidney Disease (CKD)      GFR Category          GFR (mL/min/1.73)    Interpretation  G1                     90 or greater         Normal or high (1)  G2                      60-89                Mild decrease (1)  G3a                   45-59                Mild to moderate decrease  G3b                   30-44                Moderate to severe decrease  G4                    15-29                Severe decrease  G5                    14 or less           Kidney failure          (1)In the absence of evidence of kidney disease, neither GFR category G1 or G2 fulfill the criteria for CKD.    eGFR calculation 2021 CKD-EPI creatinine equation, which does not include race as a factor    High Sensitivity Troponin T [[id number]]  (Normal) Collected: 03/25/25 1345    Specimen: Blood Updated: 03/25/25 1419     HS Troponin T 13 ng/L     Narrative:      High Sensitive Troponin T Reference Range:  <14.0 ng/L- Negative Female for AMI  <22.0 ng/L- Negative Male for AMI  >=14 - Abnormal Female indicating possible myocardial injury.  >=22 - Abnormal Male indicating possible myocardial injury.   Clinicians would have to utilize clinical acumen, EKG, Troponin, and serial changes to determine if it is an Acute Myocardial Infarction or myocardial injury due to an underlying chronic condition.         D-dimer, Quantitative [[id number]]  (Normal) Collected: 03/25/25 1345    Specimen: Blood Updated: 03/25/25 1419     D-Dimer, Quantitative <0.27 MCGFEU/mL     Narrative:      According to the assay 's published package insert, a normal (<0.50 MCGFEU/mL) D-dimer result in conjunction with a non-high clinical probability assessment, excludes deep vein thrombosis (DVT) and pulmonary embolism (PE) with high sensitivity.    D-dimer  "values increase with age and this can make VTE exclusion of an older population difficult. To address this, the [employer], based on best available evidence and recent guidelines, recommends that clinicians use age-adjusted D-dimer thresholds in patients greater than 50 years of age with: a) a low probability of PE who do not meet all Pulmonary Embolism Rule Out Criteria, or b) in those with intermediate probability of PE.   The formula for an age-adjusted D-dimer cut-off is \"age/100\".  For example, a 60 year old patient would have an age-adjusted cut-off of 0.60 MCGFEU/mL and an 80 year old 0.80 MCGFEU/mL.    [employer] Draw [[id number]] Collected: 03/25/25 1345    Specimen: Blood Updated: 03/25/25 1401    Narrative:      The following orders were created for panel order [employer] Draw.  Procedure                               Abnormality         Status                     ---------                               -----------         ------                     Green Top (Gel)[[id number]]                                  Final result               Lavender Top[[id number]]                                     Final result               Gold Top - SST[[id number]]                                   Final result               Light Blue Top[[id number]]                                   Final result                 Please view results for these tests on the individual orders.    Light Blue Top [[id number]] Collected: 03/25/25 1345    Specimen: Blood Updated: 03/25/25 1401     Extra Tube Hold for add-ons.     Comment: Auto resulted       Green Top (Gel) [[id number]] Collected: 03/25/25 1345    Specimen: Blood Updated: 03/25/25 1401     Extra Tube Hold for add-ons.     Comment: Auto resulted.       Lavender Top [[id number]] Collected: 03/25/25 1345    Specimen: Blood Updated: 03/25/25 1401     Extra Tube hold for add-on     Comment: Auto resulted       Gold Top - SST [[id number]] Collected: 03/25/25 1345    Specimen: " Blood Updated: 03/25/25 1401     Extra Tube Hold for add-ons.     Comment: Auto resulted.       CBC & Differential [[id number]]  (Abnormal) Collected: 03/25/25 1345    Specimen: Blood Updated: 03/25/25 1354    Narrative:      The following orders were created for panel order CBC & Differential.  Procedure                               Abnormality         Status                     ---------                               -----------         ------                     CBC Auto Differential[[id number]]        Abnormal            Final result                 Please view results for these tests on the individual orders.    CBC Auto Differential [[id number]]  (Abnormal) Collected: 03/25/25 1345    Specimen: Blood Updated: 03/25/25 1354     WBC 8.92 10*3/mm3      RBC 5.37 10*6/mm3      Hemoglobin 15.4 g/dL      Hematocrit 47.4 %      MCV 88.3 fL      MCH 28.7 pg      MCHC 32.5 g/dL      RDW 13.0 %      RDW-SD 42.2 fl      MPV 9.2 fL      Platelets 311 10*3/mm3      Neutrophil % 58.7 %      Lymphocyte % 28.6 %      Monocyte % 7.7 %      Eosinophil % 3.8 %      Basophil % 0.9 %      Immature Grans % 0.3 %      Neutrophils, Absolute 5.23 10*3/mm3      Lymphocytes, Absolute 2.55 10*3/mm3      Monocytes, Absolute 0.69 10*3/mm3      Eosinophils, Absolute 0.34 10*3/mm3      Basophils, Absolute 0.08 10*3/mm3      Immature Grans, Absolute 0.03 10*3/mm3      nRBC 0.0 /100 WBC             Imaging Results (Most Recent)       Procedure Component Value Units Date/Time    CT Angiogram Coronary [[id number]] Collected: 03/26/25 1235     Updated: 03/26/25 1239    Narrative:      CT ANGIOGRAM CORONARY    Date of Exam: 3/26/2025 10:51 AM EDT    Indication: Chest pain.    Comparison: None available.    Technique: Non-contrasted CT images of the chest were performed for calcium scoring purposes followed by CTA images of the chest after the uneventful intravenous administration of iodinated contrast. Reconstructed coronal and sagittal images were  also   obtained. In addition, a 3-D volume rendered image was created for interpretation. Automated exposure control and iterative reconstruction methods were used.      Findings:  Normal caliber thoracic aorta. Normal caliber main pulmonary artery without filling defect to suggest a pulmonary embolism. Heart size normal. No significant pericardial effusion. Small sliding hiatal hernia. No suspicious adenopathy. Central airways   patent. No infectious consolidation or suspicious pulmonary nodule within included field-of-view. No acute findings in the partially imaged upper abdomen. No acute musculoskeletal finding. Right anterior sclerotic rib lesion image 83 series 8 suggesting   benign bone island.      Impression:      Impression:  Small hiatal hernia, otherwise no significant noncardiac findings.    Please refer to CT Angiogram Coronary   Cardiology Interp report for information on cardiac structures.    Electronically Signed: Vicente Freedman MD    3/26/2025 12:37 PM EDT    Workstation ID: [id number]    XR Chest 1 View [[id number]] Collected: 03/25/25 1414     Updated: 03/25/25 1417    Narrative:      XR CHEST 1 VW    Date of Exam: 3/25/2025 1:51 PM EDT    Indication: Chest Pain Protocol  Chest Pain Protocol    Comparison: August 2, 2020    Findings:  The heart is not definitely enlarged. The lungs seem clear. There are no pleural effusions.      Impression:      Impression:  An acute pulmonary process is not apparent.        Electronically Signed: Sam Moses MD    3/25/2025 2:15 PM EDT    Workstation ID: [id number]          reviewed    ECG/EMG Results (most recent)       Procedure Component Value Units Date/Time    Telemetry Scan [[id number]] Resulted: 03/25/25     Updated: 03/25/25 1813    Telemetry Scan [[id number]] Resulted: 03/25/25     Updated: 03/25/25 2255    Telemetry Scan [[id number]] Resulted: 03/25/25     Updated: 03/25/25 2348    Adult Transthoracic Echo Complete W/ Cont if Necessary Per Protocol  [[id number]] Resulted: 03/25/25 2350     Updated: 03/25/25 2350     LV GLOBAL STRAIN  -17.1 %      LVIDd 4.1 cm      LVIDs 2.7 cm      IVSd 0.80 cm      LVPWd 0.80 cm      FS 34.1 %      IVS/LVPW 1.00 cm      ESV(cubed) 19.7 ml      LV Sys Vol (BSA corrected) 12.7 cm2      EDV(cubed) 68.9 ml      LV Torres Vol (BSA corrected) 34.5 cm2      LV mass(C)d 97.3 grams      LVOT area 2.8 cm2      LVOT diam 1.90 cm      EDV(MOD-sp4) 61.7 ml      ESV(MOD-sp4) 22.8 ml      SV(MOD-sp4) 38.9 ml      SVi(MOD-SP4) 21.8 ml/m2      SVi (LVOT) 27.0 ml/m2      EF(MOD-sp4) 63.0 %      MV E max bandar 90.2 cm/sec      MV A max bandar 56.7 cm/sec      MV dec time 0.15 sec      MV E/A 1.59     LA ESV Index (BP) 17.8 ml/m2      Med Peak E' Bandar 10.8 cm/sec      Lat Peak E' Bandar 12.7 cm/sec      TR max bandar 191.0 cm/sec      Avg E/e' ratio 7.68     SV(LVOT) 48.2 ml      RVIDd 2.5 cm      TAPSE (>1.6) 1.91 cm      RV S' 15.1 cm/sec      LA dimension (2D)  3.8 cm      LV V1 max 93.3 cm/sec      LV V1 max PG 3.5 mmHg      LV V1 mean PG 2.00 mmHg      LV V1 VTI 17.0 cm      Ao pk bandar 124.0 cm/sec      Ao max PG 6.2 mmHg      Ao mean PG 3.0 mmHg      Ao V2 VTI 23.2 cm      KIMBERLY(I,D) 2.08 cm2      Dimensionless Index 0.75 (DI)      MV max PG 3.1 mmHg      MV mean PG 1.00 mmHg      MV V2 VTI 17.2 cm      MV P1/2t 45.8 msec      MVA(P1/2t) 4.8 cm2      MVA(VTI) 2.8 cm2      MV dec slope 551.0 cm/sec2      TR max PG 14.6 mmHg      RVSP(TR) 17.6 mmHg      RAP systole 3.0 mmHg      RV V1 max PG 1.85 mmHg      RV V1 max 68.0 cm/sec      RV V1 VTI 13.7 cm      PA V2 max 78.4 cm/sec      PA acc time 0.10 sec      ACS 1.80 cm      Sinus 2.8 cm      EF(MOD-bp) 63.0 %     Narrative:        Estimated right ventricular systolic pressure from tricuspid   regurgitation is normal (<35 mmHg).      Impression:          Telemetry Scan [[id number]] Resulted: 03/25/25     Updated: 03/26/25 0412    ECG 12 Lead Chest Pain [[id number]] Collected: 03/25/25 1318     Updated: 03/26/25  0659     QT Interval 334 ms      QTC Interval 421 ms     Narrative:      HEART RATE=95  bpm  RR Xzgbtbin=817  ms  ME Crygqold=185  ms  P Horizontal Axis=13  deg  P Front Axis=54  deg  QRSD Interval=70  ms  QT Snrzefdu=758  ms  PRsA=356  ms  QRS Axis=-15  deg  T Wave Axis=60  deg  - NORMAL ECG -  Sinus rhythm  When compared with ECG of 02-Aug-2020 03:49:12,  No significant change  Electronically Signed By: Nadeem Marie ([facility]) 2025-03-26 06:58:40  Date and Time of Study:2025-03-25 13:18:40    Telemetry Scan [[id number]] Resulted: 03/25/25     Updated: 03/26/25 0942    Telemetry Scan [[id number]] Resulted: 03/25/25     Updated: 03/26/25 1242          reviewed            Microbiology Results (last 10 days)       ** No results found for the last 240 hours. **            Assessment & Plan     Chest pain     Chest pain        Lab Results   Component Value Date     TROPONINT 14 (H) 03/25/2025     TROPONINT 13 03/25/2025     TROPONINT <0.010 08/02/2020      -cbc, cmp, ddimer, troponin, mag, tsh, cortisol unremarkable  -Chest X-ray:reviewed and showing no acute process  -EKG:rate 95 sinus  - cardiology consulted  -cta coronary negative  - acth stimulation labs as outpt  -Telemetry  - echo performed  - dc lisinopril and start toprol xl twice daily  - follow up in 2 months  - mcot at discharge      Hypertension  -well Controlled       BP Readings from Last 1 Encounters:   03/26/25 111/77      - Continue lisinopril  - Monitor while admitted      Diabetes mellitus  -well controlled         Lab Results   Component Value Date     GLUCOSE 91 03/25/2025     GLUCOSE 173 (H) 08/02/2020      -metformin, farxiga, ssi  -Diabetic diet  -Monitor before meals and at bedtime     I discussed the patients findings and my recommendations with patient and nursing staff.     Discharge Diagnosis:      Chest pain      Hospital Course  Patient is a 49 y.o. female presented with chest pain. Er evaluated and admitted to observation.labs including cbc,  cmp, ddimer, troponin, mag , tsh and cortisol are unremarkable. Chest xray is normal. EKG rate 95 sinus. Sta coronary is normal. Echo performed. Cardiology consulted and recommends changing lisinopril to toprol xl bid and follow up as outpt in 2 months. Pt to acth stimulation test as outpt. Pt to wear mcot for 2 weeks. Discharge discussed with pt and she is agreeable to plan. Instructed pt to return to er if symptoms reoccur or worsen.      Past Medical History:     Past Medical History:   Diagnosis Date    Diabetes mellitus     GERD (gastroesophageal reflux disease)     Hyperlipidemia     Hypertension        Past Surgical History:     Past Surgical History:   Procedure Laterality Date    CHOLECYSTECTOMY      COLONOSCOPY      ENDOSCOPY      HYSTERECTOMY      TONSILLECTOMY      UPPER ENDOSCOPIC ULTRASOUND W/ FNA N/A 12/30/2024    Procedure: ENDOSCOPIC ULTRASOUND WITH ESOPHAGOGASTRODUODENOSCOPY WITH BIOPSY;  Surgeon: Derrell Mandujano MD;  Location: [facility] ENDOSCOPY;  Service: Gastroenterology;  Laterality: N/A;  GASTRIC NODULE       Social History:   Social History     Socioeconomic History    Marital status:    Tobacco Use    Smoking status: Never    Smokeless tobacco: Never   Vaping Use    Vaping status: Never Used   Substance and Sexual Activity    Alcohol use: Not Currently    Drug use: Never    Sexual activity: Defer       Procedures Performed         Consults:   Consults       Date and Time Order Name Status Description    3/25/2025  4:01 PM Inpatient Cardiology Consult              Condition on Discharge:     Stable    Discharge Disposition  Home or Self Care    Discharge Medications     Discharge Medications        New Medications        Instructions Start Date   metoprolol succinate XL 25 MG 24 hr tablet  Commonly known as: Toprol XL   25 mg, Oral, 2 Times Daily             Continue These Medications        Instructions Start Date   aspirin 81 MG EC tablet   81 mg, Daily      atorvastatin 10 MG  tablet  Commonly known as: LIPITOR   10 mg, Daily      esomeprazole 20 MG capsule  Commonly known as: nexIUM   20 mg, Every Morning Before Breakfast      Farxiga 10 MG tablet  Generic drug: dapagliflozin Propanediol   1 tablet, Daily      levocetirizine 5 MG tablet  Commonly known as: XYZAL   5 mg, Daily      metFORMIN 500 MG tablet  Commonly known as: GLUCOPHAGE   2,000 mg, Daily With Breakfast      multivitamin with minerals tablet tablet   1 tablet, Daily      Trulance 3 MG tablet  Generic drug: Plecanatide   1 tablet, Daily             Stop These Medications      lisinopril 10 MG tablet  Commonly known as: PRINIVIL,ZESTRIL              Discharge Diet:   Diet Instructions    Healthy Heart             Activity at Discharge:     Follow-up Appointments  No future appointments.  Additional Instructions for the Follow-ups that You Need to Schedule       Discharge Follow-up with Specified Provider: Dr Sam; 2 Months   As directed      To: Dr Sam   Follow Up: 2 Months        ACTH Stimulation Test (TA and DUSTIN only)    Apr 02, 2025 (Approximate)      Release to patient: Routine Release                Test Results Pending at Discharge  Pending Results       Procedure [Order ID] Specimen - Date/Time    ACTH [[id number]]     Specimen: Blood     Adult Transthoracic Echo Complete W/ Cont if Necessary Per Protocol [[id number]] Resulted: 03/25/25 2350     Updated: 03/25/25 2350     LV GLOBAL STRAIN  -17.1 %      LVIDd 4.1 cm      LVIDs 2.7 cm      IVSd 0.80 cm      LVPWd 0.80 cm      FS 34.1 %      IVS/LVPW 1.00 cm      ESV(cubed) 19.7 ml      LV Sys Vol (BSA corrected) 12.7 cm2      EDV(cubed) 68.9 ml      LV Torres Vol (BSA corrected) 34.5 cm2      LV mass(C)d 97.3 grams      LVOT area 2.8 cm2      LVOT diam 1.90 cm      EDV(MOD-sp4) 61.7 ml      ESV(MOD-sp4) 22.8 ml      SV(MOD-sp4) 38.9 ml      SVi(MOD-SP4) 21.8 ml/m2      SVi (LVOT) 27.0 ml/m2      EF(MOD-sp4) 63.0 %      MV E max arnoldo 90.2 cm/sec      MV A max arnoldo 56.7 cm/sec       MV dec time 0.15 sec      MV E/A 1.59     LA ESV Index (BP) 17.8 ml/m2      Med Peak E' Bandar 10.8 cm/sec      Lat Peak E' Bandar 12.7 cm/sec      TR max bandar 191.0 cm/sec      Avg E/e' ratio 7.68     SV(LVOT) 48.2 ml      RVIDd 2.5 cm      TAPSE (>1.6) 1.91 cm      RV S' 15.1 cm/sec      LA dimension (2D)  3.8 cm      LV V1 max 93.3 cm/sec      LV V1 max PG 3.5 mmHg      LV V1 mean PG 2.00 mmHg      LV V1 VTI 17.0 cm      Ao pk bandar 124.0 cm/sec      Ao max PG 6.2 mmHg      Ao mean PG 3.0 mmHg      Ao V2 VTI 23.2 cm      KIMBERLY(I,D) 2.08 cm2      Dimensionless Index 0.75 (DI)      MV max PG 3.1 mmHg      MV mean PG 1.00 mmHg      MV V2 VTI 17.2 cm      MV P1/2t 45.8 msec      MVA(P1/2t) 4.8 cm2      MVA(VTI) 2.8 cm2      MV dec slope 551.0 cm/sec2      TR max PG 14.6 mmHg      RVSP(TR) 17.6 mmHg      RAP systole 3.0 mmHg      RV V1 max PG 1.85 mmHg      RV V1 max 68.0 cm/sec      RV V1 VTI 13.7 cm      PA V2 max 78.4 cm/sec      PA acc time 0.10 sec      ACS 1.80 cm      Sinus 2.8 cm      EF(MOD-bp) 63.0 %     Narrative:        Estimated right ventricular systolic pressure from tricuspid   regurgitation is normal (<35 mmHg).      Impression:          Basic Metabolic Panel [[id number]]     Specimen: Blood     CBC & Differential [[id number]]     Specimen: Blood     Narrative:      The following orders were created for panel order CBC & Differential.  Procedure                               Abnormality         Status                     ---------                               -----------         ------                     CBC Auto Differential[[id number]]                                                         Please view results for these tests on the individual orders.    CBC Auto Differential [[id number]]     Specimen: Blood     Cardiac Event Monitor (WILLIAM) or Mobile Cardiac Outpatient Telemetry ([facility]) [[id number]]     Catecholamines, Fractionated, Plasma [[id number]]     Specimen: Blood     Cortisol [[id number]]     Specimen:  Blood     Cortisol [[id number]]     Specimen: Blood     Cortisol [[id number]]     Specimen: Blood     Metanephrines, Frac. Free, Plasma [[id number]]     Specimen: Blood              Risk for Readmission (LACE) Score: 1 (3/26/2025  6:00 AM)      Less Than 30 minutes spent in discharge activities for this patient    Signature:Electronically signed by Mary Esqueda PA-C, 03/26/25, 2:06 PM EDT.

## 2025-03-26 NOTE — NURSING NOTE
Patient picked up from room 220. Consent obtained and transported to CT for CTA.  Nitro SL 0.8mg given for procedure. CT completed and patient returned to room. Vitals stable and report to floor RN. Dr Sam and Dr Oakes notified

## 2025-03-26 NOTE — PROGRESS NOTES
"[facility] [place]      Patient Care Team:  Kiarra Schmuacher MD as PCP - General (Family Medicine)    Subjective    Patient reports no palpitations, chest pain, SALAZAR today she does have a headache.    Interval History and ROS:     No reports of any adverse overnight events by nursing or patient.  Patient did have coronary CTA today which showed no obstructive disease.  Echo pending currently sinus rhythm on telemetry    Review of Systems   Constitutional: Negative for chills, diaphoresis and fever.   Cardiovascular:  Negative for chest pain, dyspnea on exertion, irregular heartbeat, leg swelling, near-syncope, orthopnea, palpitations, paroxysmal nocturnal dyspnea and syncope.   Respiratory:  Negative for shortness of breath and sleep disturbances due to breathing.    Hematologic/Lymphatic: Negative for bleeding problem.   Neurological:  Negative for dizziness, focal weakness, headaches, light-headedness and sensory change.       Objective    Vital Signs  Temp:  [97.4 °F (36.3 °C)-98.1 °F (36.7 °C)] 97.5 °F (36.4 °C)  Heart Rate:  [66-93] 73  Resp:  [13-18] 15  BP: ()/(53-98) 111/77  Oxygen Therapy  SpO2: 94 %  Pulse Oximetry Type: Continuous  Device (Oxygen Therapy): room air}  Flowsheet Rows      Flowsheet Row First Filed Value   Admission Height 157.5 cm (62\") Documented at 03/25/2025 1304   Admission Weight 78 kg (171 lb 15.3 oz) Documented at 03/25/2025 1304              Physical Exam  TELE:  General Appearance:   SR  Alert, cooperative, in no acute distress   Head:    Normocephalic, without obvious abnormality, atraumatic   Eyes:            PERRLA, EOM intact, conjunctivae and sclerae normal, no  icterus       Throat:   Oral mucosa moist, No oral lesions, no thrush   Neck:   No carotid bruit, no JVD, supple, trachea midline, no thyromegaly    Lungs:     Clear to auscultation,respirations regular, even and   unlabored, RA    Heart:    Regular rhythm and normal rate, normal S1 and S2, no   murmur, no " gallop, no rub, no click   Chest Wall:    No abnormalities observed   Abdomen:     Soft,  non-tender, non-distended, no guarding, no rebound  tenderness   Extremities:   No edema, no cyanosis or redness   Pulses:   Pulses palpable and equal bilaterally in all extremities   Skin:   No bleeding, bruising or rash       Neurologic:   Normal mood, thought content and behavior     Results Review:     I reviewed the patient's new clinical results.    Lab Results (last 24 hours)       Procedure Component Value Units Date/Time    POC Glucose 4x Daily Before Meals & at Bedtime [[id number]]  (Abnormal) Collected: 03/26/25 1156    Specimen: Blood Updated: 03/26/25 1158     Glucose 124 mg/dL      Comment: Serial Number: [id number]:  [id number]       POC Glucose 4x Daily Before Meals & at Bedtime [[id number]]  (Abnormal) Collected: 03/26/25 0751    Specimen: Blood Updated: 03/26/25 0753     Glucose 137 mg/dL      Comment: Serial Number: [id number]:  [id number]       POC Glucose Once [[id number]]  (Abnormal) Collected: 03/25/25 2108    Specimen: Blood Updated: 03/25/25 2109     Glucose 170 mg/dL      Comment: Serial Number: [id number]:  [id number]       Hemoglobin A1c [[id number]]  (Abnormal) Collected: 03/25/25 1345    Specimen: Blood Updated: 03/25/25 1657     Hemoglobin A1C 6.90 %     High Sensitivity Troponin T 1Hr [[id number]]  (Abnormal) Collected: 03/25/25 1517    Specimen: Blood Updated: 03/25/25 1544     HS Troponin T 14 ng/L      Troponin T Numeric Delta 1 ng/L     Narrative:      High Sensitive Troponin T Reference Range:  <14.0 ng/L- Negative Female for AMI  <22.0 ng/L- Negative Male for AMI  >=14 - Abnormal Female indicating possible myocardial injury.  >=22 - Abnormal Male indicating possible myocardial injury.   Clinicians would have to utilize clinical acumen, EKG, Troponin, and serial changes to determine if it is an Acute Myocardial Infarction or myocardial injury due to an underlying chronic  condition.         Cortisol [[id number]] Collected: 03/25/25 1345    Specimen: Blood Updated: 03/25/25 1523     Cortisol 7.53 mcg/dL     Narrative:      Cortisol Reference Ranges:    Cortisol 6AM - 10AM Range: 6.02-18.40 mcg/dl  Cortisol 4PM - 8PM Range: 2.68-10.50 mcg/dl      Results may be falsely increased if patient taking Biotin.      TSH Rfx On Abnormal To Free T4 [[id number]]  (Normal) Collected: 03/25/25 1345    Specimen: Blood Updated: 03/25/25 1447     TSH 1.250 uIU/mL     Magnesium [[id number]]  (Normal) Collected: 03/25/25 1345    Specimen: Blood Updated: 03/25/25 1447     Magnesium 2.2 mg/dL     Comprehensive Metabolic Panel [[id number]] Collected: 03/25/25 1345    Specimen: Blood Updated: 03/25/25 1419     Glucose 91 mg/dL      BUN 9 mg/dL      Creatinine 0.76 mg/dL      Sodium 140 mmol/L      Potassium 4.2 mmol/L      Chloride 103 mmol/L      CO2 22.3 mmol/L      Calcium 9.6 mg/dL      Total Protein 7.2 g/dL      Albumin 4.6 g/dL      ALT (SGPT) 31 U/L      AST (SGOT) 23 U/L      Alkaline Phosphatase 104 U/L      Total Bilirubin 0.9 mg/dL      Globulin 2.6 gm/dL      A/G Ratio 1.8 g/dL      BUN/Creatinine Ratio 11.8     Anion Gap 14.7 mmol/L      eGFR 95.0 mL/min/1.73     Narrative:      GFR Categories in Chronic Kidney Disease (CKD)      GFR Category          GFR (mL/min/1.73)    Interpretation  G1                     90 or greater         Normal or high (1)  G2                      60-89                Mild decrease (1)  G3a                   45-59                Mild to moderate decrease  G3b                   30-44                Moderate to severe decrease  G4                    15-29                Severe decrease  G5                    14 or less           Kidney failure          (1)In the absence of evidence of kidney disease, neither GFR category G1 or G2 fulfill the criteria for CKD.    eGFR calculation 2021 CKD-EPI creatinine equation, which does not include race as a factor    High Sensitivity  "Troponin T [[id number]]  (Normal) Collected: 03/25/25 1345    Specimen: Blood Updated: 03/25/25 1419     HS Troponin T 13 ng/L     Narrative:      High Sensitive Troponin T Reference Range:  <14.0 ng/L- Negative Female for AMI  <22.0 ng/L- Negative Male for AMI  >=14 - Abnormal Female indicating possible myocardial injury.  >=22 - Abnormal Male indicating possible myocardial injury.   Clinicians would have to utilize clinical acumen, EKG, Troponin, and serial changes to determine if it is an Acute Myocardial Infarction or myocardial injury due to an underlying chronic condition.         D-dimer, Quantitative [[id number]]  (Normal) Collected: 03/25/25 1345    Specimen: Blood Updated: 03/25/25 1419     D-Dimer, Quantitative <0.27 MCGFEU/mL     Narrative:      According to the assay 's published package insert, a normal (<0.50 MCGFEU/mL) D-dimer result in conjunction with a non-high clinical probability assessment, excludes deep vein thrombosis (DVT) and pulmonary embolism (PE) with high sensitivity.    D-dimer values increase with age and this can make VTE exclusion of an older population difficult. To address this, the [employer], based on best available evidence and recent guidelines, recommends that clinicians use age-adjusted D-dimer thresholds in patients greater than 50 years of age with: a) a low probability of PE who do not meet all Pulmonary Embolism Rule Out Criteria, or b) in those with intermediate probability of PE.   The formula for an age-adjusted D-dimer cut-off is \"age/100\".  For example, a 60 year old patient would have an age-adjusted cut-off of 0.60 MCGFEU/mL and an 80 year old 0.80 MCGFEU/mL.            Imaging Results (Last 24 Hours)       Procedure Component Value Units Date/Time    CT Angiogram Coronary [[id number]] Collected: 03/26/25 1235     Updated: 03/26/25 1239    Narrative:      CT ANGIOGRAM CORONARY    Date of Exam: 3/26/2025 10:51 AM EDT    Indication: " Chest pain.    Comparison: None available.    Technique: Non-contrasted CT images of the chest were performed for calcium scoring purposes followed by CTA images of the chest after the uneventful intravenous administration of iodinated contrast. Reconstructed coronal and sagittal images were also   obtained. In addition, a 3-D volume rendered image was created for interpretation. Automated exposure control and iterative reconstruction methods were used.      Findings:  Normal caliber thoracic aorta. Normal caliber main pulmonary artery without filling defect to suggest a pulmonary embolism. Heart size normal. No significant pericardial effusion. Small sliding hiatal hernia. No suspicious adenopathy. Central airways   patent. No infectious consolidation or suspicious pulmonary nodule within included field-of-view. No acute findings in the partially imaged upper abdomen. No acute musculoskeletal finding. Right anterior sclerotic rib lesion image 83 series 8 suggesting   benign bone island.      Impression:      Impression:  Small hiatal hernia, otherwise no significant noncardiac findings.    Please refer to CT Angiogram Coronary   Cardiology Interp report for information on cardiac structures.    Electronically Signed: Vicente Freedman MD    3/26/2025 12:37 PM EDT    Workstation ID: [id number]    XR Chest 1 View [[id number]] Collected: 03/25/25 1414     Updated: 03/25/25 1417    Narrative:      XR CHEST 1 VW    Date of Exam: 3/25/2025 1:51 PM EDT    Indication: Chest Pain Protocol  Chest Pain Protocol    Comparison: August 2, 2020    Findings:  The heart is not definitely enlarged. The lungs seem clear. There are no pleural effusions.      Impression:      Impression:  An acute pulmonary process is not apparent.        Electronically Signed: Sam Moses MD    3/25/2025 2:15 PM EDT    Workstation ID: [id number]          ECG/EMG Results (most recent)       Procedure Component Value Units Date/Time    Telemetry Scan  [[id number]] Resulted: 03/25/25     Updated: 03/25/25 1813    Telemetry Scan [[id number]] Resulted: 03/25/25     Updated: 03/25/25 2255    Telemetry Scan [[id number]] Resulted: 03/25/25     Updated: 03/25/25 2348    Adult Transthoracic Echo Complete W/ Cont if Necessary Per Protocol [[id number]] Resulted: 03/25/25 2350     Updated: 03/25/25 2350     LV GLOBAL STRAIN  -17.1 %      LVIDd 4.1 cm      LVIDs 2.7 cm      IVSd 0.80 cm      LVPWd 0.80 cm      FS 34.1 %      IVS/LVPW 1.00 cm      ESV(cubed) 19.7 ml      LV Sys Vol (BSA corrected) 12.7 cm2      EDV(cubed) 68.9 ml      LV Torres Vol (BSA corrected) 34.5 cm2      LV mass(C)d 97.3 grams      LVOT area 2.8 cm2      LVOT diam 1.90 cm      EDV(MOD-sp4) 61.7 ml      ESV(MOD-sp4) 22.8 ml      SV(MOD-sp4) 38.9 ml      SVi(MOD-SP4) 21.8 ml/m2      SVi (LVOT) 27.0 ml/m2      EF(MOD-sp4) 63.0 %      MV E max bandar 90.2 cm/sec      MV A max bandar 56.7 cm/sec      MV dec time 0.15 sec      MV E/A 1.59     LA ESV Index (BP) 17.8 ml/m2      Med Peak E' Bandar 10.8 cm/sec      Lat Peak E' Bandar 12.7 cm/sec      TR max bandar 191.0 cm/sec      Avg E/e' ratio 7.68     SV(LVOT) 48.2 ml      RVIDd 2.5 cm      TAPSE (>1.6) 1.91 cm      RV S' 15.1 cm/sec      LA dimension (2D)  3.8 cm      LV V1 max 93.3 cm/sec      LV V1 max PG 3.5 mmHg      LV V1 mean PG 2.00 mmHg      LV V1 VTI 17.0 cm      Ao pk bandar 124.0 cm/sec      Ao max PG 6.2 mmHg      Ao mean PG 3.0 mmHg      Ao V2 VTI 23.2 cm      KIMBERLY(I,D) 2.08 cm2      Dimensionless Index 0.75 (DI)      MV max PG 3.1 mmHg      MV mean PG 1.00 mmHg      MV V2 VTI 17.2 cm      MV P1/2t 45.8 msec      MVA(P1/2t) 4.8 cm2      MVA(VTI) 2.8 cm2      MV dec slope 551.0 cm/sec2      TR max PG 14.6 mmHg      RVSP(TR) 17.6 mmHg      RAP systole 3.0 mmHg      RV V1 max PG 1.85 mmHg      RV V1 max 68.0 cm/sec      RV V1 VTI 13.7 cm      PA V2 max 78.4 cm/sec      PA acc time 0.10 sec      ACS 1.80 cm      Sinus 2.8 cm      EF(MOD-bp) 63.0 %     Narrative:         Estimated right ventricular systolic pressure from tricuspid   regurgitation is normal (<35 mmHg).      Impression:          Telemetry Scan [[id number]] Resulted: 03/25/25     Updated: 03/26/25 0412    ECG 12 Lead Chest Pain [[id number]] Collected: 03/25/25 1318     Updated: 03/26/25 0659     QT Interval 334 ms      QTC Interval 421 ms     Narrative:      HEART RATE=95  bpm  RR Hdezqgtc=184  ms  WY Dfkwrjkv=677  ms  P Horizontal Axis=13  deg  P Front Axis=54  deg  QRSD Interval=70  ms  QT Edoktelk=082  ms  WYsG=366  ms  QRS Axis=-15  deg  T Wave Axis=60  deg  - NORMAL ECG -  Sinus rhythm  When compared with ECG of 02-Aug-2020 03:49:12,  No significant change  Electronically Signed By: Nadeem Marie ([facility]) 2025-03-26 06:58:40  Date and Time of Study:2025-03-25 13:18:40    Telemetry Scan [[id number]] Resulted: 03/25/25     Updated: 03/26/25 0942    Telemetry Scan [[id number]] Resulted: 03/25/25     Updated: 03/26/25 1242            Medication Review:   I have reviewed the patient's current medication list    Current Facility-Administered Medications:     acetaminophen (TYLENOL) tablet 650 mg, 650 mg, Oral, Q4H PRN, 650 mg at 03/26/25 1408 **OR** acetaminophen (TYLENOL) 160 MG/5ML oral solution 650 mg, 650 mg, Oral, Q4H PRN **OR** acetaminophen (TYLENOL) suppository 650 mg, 650 mg, Rectal, Q4H PRN, Tripure, Katrina S, APRN    aluminum-magnesium hydroxide-simethicone (MAALOX MAX) 400-400-40 MG/5ML suspension 15 mL, 15 mL, Oral, Q6H PRN, Tripure, Katrina S, APRN    aspirin EC tablet 81 mg, 81 mg, Oral, Daily, Tripure, Katrina S, APRN, 81 mg at 03/26/25 1226    atorvastatin (LIPITOR) tablet 10 mg, 10 mg, Oral, Daily, Tripure, Katrina S, APRN, 10 mg at 03/26/25 1226    sennosides-docusate (PERICOLACE) 8.6-50 MG per tablet 2 tablet, 2 tablet, Oral, BID PRN **AND** polyethylene glycol (MIRALAX) packet 17 g, 17 g, Oral, Daily PRN **AND** bisacodyl (DULCOLAX) EC tablet 5 mg, 5 mg, Oral, Daily PRN **AND** bisacodyl (DULCOLAX)  suppository 10 mg, 10 mg, Rectal, Daily PRN, Katrina Jacinto APRN    cetirizine (zyrTEC) tablet 10 mg, 10 mg, Oral, Daily, Katrina Jacinto APRN, 10 mg at 03/26/25 1227    ACTH, , , Once **AND** Cortisol, , , Q30 Min **AND** cosyntropin (CORTROSYN) injection 0.25 mg, 0.25 mg, Intravenous, Once, Damion Sam MD    dextrose (D50W) (25 g/50 mL) IV injection 25 g, 25 g, Intravenous, Q15 Min PRN, Katrina Jacinto APRN    dextrose (GLUTOSE) oral gel 15 g, 15 g, Oral, Q15 Min PRN, Katrina Jacinto APRN    empagliflozin (JARDIANCE) tablet 25 mg, 25 mg, Oral, Daily, Katrina Jacinto APRN    glucagon (GLUCAGEN) injection 1 mg, 1 mg, Intramuscular, Q15 Min PRN, Katrina Jacinto APRN    insulin lispro (HUMALOG/ADMELOG) injection 2-9 Units, 2-9 Units, Subcutaneous, 4x Daily AC & at Bedtime, Katrina Jacinto APRN    lisinopril (PRINIVIL,ZESTRIL) tablet 10 mg, 10 mg, Oral, Daily, Katrina Jacinto APRN, 10 mg at 03/26/25 1227    metoprolol tartrate (LOPRESSOR) injection 5 mg, 5 mg, Intravenous, Q5 Min PRN, Damion Sam MD    metoprolol tartrate (LOPRESSOR) tablet 25 mg, 25 mg, Oral, Q6H, Damion Sam MD, 25 mg at 03/26/25 1408    nitroglycerin (NITROSTAT) SL tablet 0.4 mg, 0.4 mg, Sublingual, Q5 Min PRN, Katrina Jacinto APRN    nitroglycerin (NITROSTAT) SL tablet 0.4 mg, 0.4 mg, Sublingual, Once in imaging, Damion Sam MD    ondansetron ODT (ZOFRAN-ODT) disintegrating tablet 4 mg, 4 mg, Oral, Q6H PRN **OR** ondansetron (ZOFRAN) injection 4 mg, 4 mg, Intravenous, Q6H PRN, Katrina Jacinto, APRN    pantoprazole (PROTONIX) EC tablet 40 mg, 40 mg, Oral, Q AM, Katrina Jacinto S, APRN, 40 mg at 03/26/25 0550    sodium chloride 0.9 % flush 10 mL, 10 mL, Intravenous, PRN, Nadeem Marie MD    sodium chloride 0.9 % flush 10 mL, 10 mL, Intravenous, Q12H, Katrina Jacinto S, APRN, 10 mL at 03/26/25 0919    sodium chloride 0.9 % flush 10 mL, 10 mL, Intravenous, PRN, Katrina Jacinto S,  APRN    sodium chloride 0.9 % infusion 40 mL, 40 mL, Intravenous, PRN, Katrina Jacinto, BOWEN    sodium chloride 0.9 % infusion, 100 mL/hr, Intravenous, Continuous, Damion Sam MD, Last Rate: 100 mL/hr at 03/26/25 1133, 100 mL/hr at 03/26/25 1133      Assessment & Plan     Palpitation  Chest discomfort  Shortness of breath  Diaphoresis  Hypertension  Diabetes mellitus    Plan:  States she has not had any palpitations since her heart rate has come into normal ranges, no chest pain or shortness of breath today   No new labs today  -110, sinus rhythm on monitor rate 60-70  echo-await result  CTA coronary-no obstructive dz  Lopressor  Plan outpatient testing for hypercortisolism as well as pheochromocytoma, ACTH stimulation test  Plan M cot on discharge    BOWEN Arizmendi  03/26/25  14:11 EDT

## 2025-03-26 NOTE — PLAN OF CARE
Problem: Adult Inpatient Plan of Care  Goal: Plan of Care Review  Outcome: Progressing  Goal: Patient-Specific Goal (Individualized)  Outcome: Progressing  Goal: Absence of Hospital-Acquired Illness or Injury  Outcome: Progressing  Intervention: Identify and Manage Fall Risk  Recent Flowsheet Documentation  Taken 3/26/2025 0400 by Isabel Olvera RN  Safety Promotion/Fall Prevention: safety round/check completed  Taken 3/26/2025 0200 by Isabel Olvera RN  Safety Promotion/Fall Prevention: safety round/check completed  Taken 3/26/2025 0031 by Isabel Olvera RN  Safety Promotion/Fall Prevention: safety round/check completed  Taken 3/25/2025 2230 by Isabel Olvera RN  Safety Promotion/Fall Prevention: safety round/check completed  Taken 3/25/2025 2030 by Isabel Olvera RN  Safety Promotion/Fall Prevention: safety round/check completed  Intervention: Prevent Skin Injury  Recent Flowsheet Documentation  Taken 3/25/2025 2030 by Isabel Olvera RN  Body Position: position changed independently  Skin Protection: transparent dressing maintained  Intervention: Prevent and Manage VTE (Venous Thromboembolism) Risk  Recent Flowsheet Documentation  Taken 3/25/2025 2030 by Isabel Olvera RN  VTE Prevention/Management:   compression stockings off   foot pump device off   SCDs (sequential compression devices) off  Goal: Optimal Comfort and Wellbeing  Outcome: Progressing  Intervention: Provide Person-Centered Care  Recent Flowsheet Documentation  Taken 3/26/2025 0031 by Isabel Olvera RN  Trust Relationship/Rapport:   care explained   choices provided   questions answered   questions encouraged   thoughts/feelings acknowledged  Taken 3/25/2025 2030 by Isabel Olvera RN  Trust Relationship/Rapport:   care explained   choices provided   questions answered   questions encouraged   thoughts/feelings acknowledged  Goal: Readiness for Transition of Care  Outcome: Progressing    Goal Outcome Evaluation:

## 2025-03-26 NOTE — CASE MANAGEMENT/SOCIAL WORK
Continued Stay Note   [facility]     Patient Name: Alejandra Gilbert  MRN: [id number]  Today's Date: 3/26/2025    Admit Date: 3/25/2025    Plan: From Home with Family   Discharge Plan       Row Name 03/26/25 1204       Plan    Plan Comments Barriers: Cardiology following with pending CT Angiogram Coronary-Cardiology Interpretation                             Jemma SHEA,RN Case Manager  [facility]  Phone: Desk- [phone] cell- [phone]

## 2025-03-26 NOTE — H&P
[facility] Observation Unit H&P    Patient Name: Alejandra Gilbert  : 1975  MRN: [id number]  Primary Care Physician: Kiarra Schumacher MD  Date of admission: 3/25/2025     Patient Care Team:  Kiarra Schumacher MD as PCP - General (Family Medicine)          Subjective   History Present Illness     Chief Complaint:   Chief Complaint   Patient presents with    Chest Pain     Chest pain    Chest Pain   Associated symptoms include palpitations.         ED  49-year-old female states she was sitting at her desk at work today and started feeling her heart racing with associated diaphoresis and lightheadedness. She describes associated chest pressure. States it lasted a few minutes and then resolved. Notes that she did have some persistent mild tachycardia since that time. She reports no ongoing chest pain.     Observation 3/26/25  Pt concurs with er hpi. Cardiology consulted. Cta coronary pending. Echo pending. Awaiting labs to check cortisol.     Review of Systems   Cardiovascular:  Positive for chest pain and palpitations.   Neurological:  Positive for light-headedness.             Personal History     Past Medical History:   Past Medical History:   Diagnosis Date    Diabetes mellitus     GERD (gastroesophageal reflux disease)     Hyperlipidemia     Hypertension        Surgical History:      Past Surgical History:   Procedure Laterality Date    CHOLECYSTECTOMY      COLONOSCOPY      ENDOSCOPY      HYSTERECTOMY      TONSILLECTOMY      UPPER ENDOSCOPIC ULTRASOUND W/ FNA N/A 2024    Procedure: ENDOSCOPIC ULTRASOUND WITH ESOPHAGOGASTRODUODENOSCOPY WITH BIOPSY;  Surgeon: Derrell Mandujano MD;  Location: [facility] ENDOSCOPY;  Service: Gastroenterology;  Laterality: N/A;  GASTRIC NODULE           Family History: family history is not on file. Otherwise pertinent FHx was reviewed and unremarkable.     Social History:  reports that she has never smoked. She has never used smokeless tobacco. She reports that she does not  currently use alcohol. She reports that she does not use drugs.      Medications:  Prior to Admission medications    Medication Sig Start Date End Date Taking? Authorizing Provider   aspirin 81 MG EC tablet Take 1 tablet by mouth Daily.   Yes Jean Altamirano MD   atorvastatin (LIPITOR) 10 MG tablet Take 1 tablet by mouth Daily.   Yes Jean Altamirano MD   dapagliflozin Propanediol (Farxiga) 10 MG tablet Take 10 mg by mouth Daily.   Yes Jean Altamirano MD   esomeprazole (nexIUM) 20 MG capsule Take 1 capsule by mouth Every Morning Before Breakfast.   Yes Jean Altamirano MD   levocetirizine (XYZAL) 5 MG tablet Take 1 tablet by mouth Daily.   Yes Jean Altamirano MD   lisinopril (PRINIVIL,ZESTRIL) 10 MG tablet Take 1 tablet by mouth Daily.   Yes Jean Altamirano MD   metFORMIN (GLUCOPHAGE) 500 MG tablet Take 4 tablets by mouth Daily With Breakfast.   Yes Jean Altamirano MD   multivitamin with minerals tablet tablet Take 1 tablet by mouth Daily.   Yes Jean Altamirano MD   Plecanatide (Trulance) 3 MG tablet Take 1 tablet by mouth Daily.   Yes Jean Altamirano MD       Allergies:  No Known Allergies    Objective   Objective     Vital Signs  Temp:  [97.4 °F (36.3 °C)-98.5 °F (36.9 °C)] 97.9 °F (36.6 °C)  Heart Rate:  [] 70  Resp:  [13-18] 14  BP: ()/(53-98) 107/73  SpO2:  [95 %-99 %] 97 %  on   ;   Device (Oxygen Therapy): room air  Body mass index is 31.24 kg/m².    Physical Exam  Constitutional:       Appearance: Normal appearance. She is obese.   Cardiovascular:      Rate and Rhythm: Normal rate and regular rhythm.   Pulmonary:      Effort: Pulmonary effort is normal.      Breath sounds: Normal breath sounds.   Abdominal:      Palpations: Abdomen is soft.   Skin:     General: Skin is warm and dry.   Neurological:      General: No focal deficit present.      Mental Status: She is alert and oriented to person, place, and time. Mental status is at baseline.    Psychiatric:         Mood and Affect: Mood normal.         Behavior: Behavior normal.       Results Review:  I have personally reviewed most recent cardiac tracings, lab results, and radiology images and interpretations and agree with findings, most notably: cbc, cmp, tsh, A1c, ddimer, cortisol, chest xray, ekg.    Results from last 7 days   Lab Units 03/25/25  1345   WBC 10*3/mm3 8.92   HEMOGLOBIN g/dL 15.4   HEMATOCRIT % 47.4*   PLATELETS 10*3/mm3 311     Results from last 7 days   Lab Units 03/25/25  1517 03/25/25  1345   SODIUM mmol/L  --  140   POTASSIUM mmol/L  --  4.2   CHLORIDE mmol/L  --  103   CO2 mmol/L  --  22.3   BUN mg/dL  --  9   CREATININE mg/dL  --  0.76   GLUCOSE mg/dL  --  91   CALCIUM mg/dL  --  9.6   ALK PHOS U/L  --  104   ALT (SGPT) U/L  --  31   AST (SGOT) U/L  --  23   HSTROP T ng/L 14* 13     Estimated Creatinine Clearance: 84.5 mL/min (by C-G formula based on SCr of 0.76 mg/dL).  Brief Urine Lab Results  (Last result in the past 365 days)        Color   Clarity   Blood   Leuk Est   Nitrite   Protein   CREAT   Urine HCG        10/22/24 1125 Yellow     Negative   Negative   Negative                     Microbiology Results (last 10 days)       ** No results found for the last 240 hours. **            ECG/EMG Results (most recent)       Procedure Component Value Units Date/Time    Telemetry Scan [[id number]] Resulted: 03/25/25     Updated: 03/25/25 1813    Telemetry Scan [[id number]] Resulted: 03/25/25     Updated: 03/25/25 2255    Telemetry Scan [[id number]] Resulted: 03/25/25     Updated: 03/25/25 2348    Adult Transthoracic Echo Complete W/ Cont if Necessary Per Protocol [[id number]] Resulted: 03/25/25 2350     Updated: 03/25/25 2350     LV GLOBAL STRAIN  -17.1 %      LVIDd 4.1 cm      LVIDs 2.7 cm      IVSd 0.80 cm      LVPWd 0.80 cm      FS 34.1 %      IVS/LVPW 1.00 cm      ESV(cubed) 19.7 ml      LV Sys Vol (BSA corrected) 12.7 cm2      EDV(cubed) 68.9 ml      LV Torres Vol (BSA corrected)  34.5 cm2      LV mass(C)d 97.3 grams      LVOT area 2.8 cm2      LVOT diam 1.90 cm      EDV(MOD-sp4) 61.7 ml      ESV(MOD-sp4) 22.8 ml      SV(MOD-sp4) 38.9 ml      SVi(MOD-SP4) 21.8 ml/m2      SVi (LVOT) 27.0 ml/m2      EF(MOD-sp4) 63.0 %      MV E max bandar 90.2 cm/sec      MV A max bandar 56.7 cm/sec      MV dec time 0.15 sec      MV E/A 1.59     LA ESV Index (BP) 17.8 ml/m2      Med Peak E' Bandar 10.8 cm/sec      Lat Peak E' Bandar 12.7 cm/sec      TR max bandar 191.0 cm/sec      Avg E/e' ratio 7.68     SV(LVOT) 48.2 ml      RVIDd 2.5 cm      TAPSE (>1.6) 1.91 cm      RV S' 15.1 cm/sec      LA dimension (2D)  3.8 cm      LV V1 max 93.3 cm/sec      LV V1 max PG 3.5 mmHg      LV V1 mean PG 2.00 mmHg      LV V1 VTI 17.0 cm      Ao pk bandar 124.0 cm/sec      Ao max PG 6.2 mmHg      Ao mean PG 3.0 mmHg      Ao V2 VTI 23.2 cm      KIMBERLY(I,D) 2.08 cm2      Dimensionless Index 0.75 (DI)      MV max PG 3.1 mmHg      MV mean PG 1.00 mmHg      MV V2 VTI 17.2 cm      MV P1/2t 45.8 msec      MVA(P1/2t) 4.8 cm2      MVA(VTI) 2.8 cm2      MV dec slope 551.0 cm/sec2      TR max PG 14.6 mmHg      RVSP(TR) 17.6 mmHg      RAP systole 3.0 mmHg      RV V1 max PG 1.85 mmHg      RV V1 max 68.0 cm/sec      RV V1 VTI 13.7 cm      PA V2 max 78.4 cm/sec      PA acc time 0.10 sec      ACS 1.80 cm      Sinus 2.8 cm      EF(MOD-bp) 63.0 %     Narrative:        Estimated right ventricular systolic pressure from tricuspid   regurgitation is normal (<35 mmHg).      Impression:          Telemetry Scan [[id number]] Resulted: 03/25/25     Updated: 03/26/25 0412    ECG 12 Lead Chest Pain [[id number]] Collected: 03/25/25 1318     Updated: 03/26/25 0659     QT Interval 334 ms      QTC Interval 421 ms     Narrative:      HEART RATE=95  bpm  RR Tirbbaqu=028  ms  AL Ixarvefo=516  ms  P Horizontal Axis=13  deg  P Front Axis=54  deg  QRSD Interval=70  ms  QT Vrdzmusi=940  ms  OFhJ=589  ms  QRS Axis=-15  deg  T Wave Axis=60  deg  - NORMAL ECG -  Sinus rhythm  When compared  with ECG of 02-Aug-2020 03:49:12,  No significant change  Electronically Signed By: Nadeem Marie (Vargas) 2025-03-26 06:58:40  Date and Time of Study:2025-03-25 13:18:40    Telemetry Scan [[id number]] Resulted: 03/25/25     Updated: 03/26/25 0942                    XR Chest 1 View  Result Date: 3/25/2025  Impression: An acute pulmonary process is not apparent. Electronically Signed: Sam Moses MD  3/25/2025 2:15 PM EDT  Workstation ID: [id number]        Estimated Creatinine Clearance: 84.5 mL/min (by C-G formula based on SCr of 0.76 mg/dL).    Assessment & Plan   Assessment/Plan       Active Hospital Problems    Diagnosis  POA    **Chest pain [R07.9]  Yes      Resolved Hospital Problems   No resolved problems to display.       Chest pain  Lab Results   Component Value Date    TROPONINT 14 (H) 03/25/2025    TROPONINT 13 03/25/2025    TROPONINT <0.010 08/02/2020     -cbc, cmp, ddimer, troponin, mag, tsh, cortisol unremarkable  -Chest X-ray:reviewed and showing no acute process  -EKG:rate 95 sinus  - cardiology consulted  -cta coronary pending  - acth stimulation labs pending  -Telemetry  - echo pending    Hypertension  -well Controlled   BP Readings from Last 1 Encounters:   03/26/25 111/77     - Continue lisinopril  - Monitor while admitted     Diabetes mellitus  -well controlled   Lab Results   Component Value Date    GLUCOSE 91 03/25/2025    GLUCOSE 173 (H) 08/02/2020     -metformin, farxiga, ssi  -Diabetic diet  -Monitor before meals and at bedtime         VTE Prophylaxis - Active VTE Prophylaxis  Mechanical:        Start        03/25/25 1753  Maintain Sequential Compression Device  Continuous                          Select Pharmacologic VTE Prophylaxis if Desired & Appropriate      CODE STATUS:    Code Status and Medical Interventions: CPR (Attempt to Resuscitate); Full Support   Ordered at: 03/25/25 1619     Code Status (Patient has no pulse and is not breathing):    CPR (Attempt to Resuscitate)     Medical  Interventions (Patient has pulse or is breathing):    Full Support     Level Of Support Discussed With:    Patient       This patient has been examined wearing personal protective equipment.     I discussed the patient's findings and my recommendations with patient and nursing staff.      Signature:Electronically signed by Mary Esqueda PA-C, 03/26/25, 12:18 PM EDT.

## 2025-03-31 ENCOUNTER — HOSPITAL ENCOUNTER (OUTPATIENT)
Dept: INFUSION THERAPY | Facility: HOSPITAL | Age: 50
Discharge: HOME | End: 2025-03-31
Admitting: PHYSICIAN ASSISTANT
Payer: COMMERCIAL

## 2025-03-31 VITALS
RESPIRATION RATE: 14 BRPM | DIASTOLIC BLOOD PRESSURE: 83 MMHG | SYSTOLIC BLOOD PRESSURE: 113 MMHG | HEART RATE: 59 BPM | TEMPERATURE: 97.8 F

## 2025-03-31 DIAGNOSIS — R00.2 PALPITATIONS: ICD-10-CM

## 2025-03-31 LAB
CORTIS 1H P CHAL SERPL-MCNC: 28.7 MCG/DL
CORTIS 30M P ACTH SERPL-MCNC: 27 MCG/DL
CORTIS BS SERPL-MCNC: 7.66 MCG/DL (ref 4–22)

## 2025-03-31 PROCEDURE — 96374 THER/PROPH/DIAG INJ IV PUSH: CPT

## 2025-03-31 PROCEDURE — 25010000002 COSYNTROPIN PER 0.25 MG: Performed by: PHYSICIAN ASSISTANT

## 2025-03-31 PROCEDURE — 82533 TOTAL CORTISOL: CPT | Performed by: PHYSICIAN ASSISTANT

## 2025-03-31 PROCEDURE — G0463 HOSPITAL OUTPT CLINIC VISIT: HCPCS

## 2025-03-31 RX ORDER — SODIUM CHLORIDE 9 MG/ML
INJECTION, SOLUTION INTRAMUSCULAR; INTRAVENOUS; SUBCUTANEOUS
Status: COMPLETED
Start: 2025-03-31 | End: 2025-03-31

## 2025-03-31 RX ORDER — COSYNTROPIN 0.25 MG/ML
0.25 INJECTION, POWDER, FOR SOLUTION INTRAMUSCULAR; INTRAVENOUS ONCE
Status: COMPLETED | OUTPATIENT
Start: 2025-03-31 | End: 2025-03-31

## 2025-03-31 RX ADMIN — SODIUM CHLORIDE 1 ML: 9 INJECTION, SOLUTION INTRAMUSCULAR; INTRAVENOUS; SUBCUTANEOUS at 08:32

## 2025-03-31 RX ADMIN — COSYNTROPIN 0.25 MG: 0.25 INJECTION, POWDER, LYOPHILIZED, FOR SOLUTION INTRAMUSCULAR; INTRAVENOUS at 08:32

## 2025-04-28 LAB
CV ZIO BASELINE AVG BPM: 77
CV ZIO BASELINE BPM HIGH: 120
CV ZIO BASELINE BPM LOW: 51

## 2025-05-30 ENCOUNTER — OFFICE VISIT (OUTPATIENT)
Dept: CARDIOLOGY | Facility: CLINIC | Age: 50
End: 2025-05-30
Payer: COMMERCIAL

## 2025-05-30 VITALS
BODY MASS INDEX: 32.55 KG/M2 | DIASTOLIC BLOOD PRESSURE: 83 MMHG | WEIGHT: 178 LBS | RESPIRATION RATE: 16 BRPM | OXYGEN SATURATION: 97 % | SYSTOLIC BLOOD PRESSURE: 120 MMHG | HEART RATE: 76 BPM

## 2025-05-30 DIAGNOSIS — I10 HYPERTENSION, UNSPECIFIED TYPE: Primary | ICD-10-CM

## 2025-05-30 DIAGNOSIS — E78.5 HYPERLIPIDEMIA, UNSPECIFIED HYPERLIPIDEMIA TYPE: ICD-10-CM

## 2025-05-30 RX ORDER — METOPROLOL SUCCINATE 50 MG/1
50 TABLET, EXTENDED RELEASE ORAL DAILY
Qty: 90 TABLET | Refills: 3 | Status: SHIPPED | OUTPATIENT
Start: 2025-05-30

## 2025-05-30 RX ORDER — HYDROCHLOROTHIAZIDE 12.5 MG/1
CAPSULE ORAL
COMMUNITY
Start: 2025-04-08

## 2025-05-30 NOTE — PROGRESS NOTES
Cardiology Office Follow Up Visit      Primary Care Provider:  Kiarra Schumacher MD    Reason for f/u:     Hospital follow-up  Hypertension  Dyslipidemia      Subjective     CC:    Denies chest pain or dyspnea    History of Present Illness       Alejandra Gilbert is a 49 y.o. female.  She is here today for hospital follow-up.  She was previously evaluated by Dr. Sam.  She is known to have hypertension, dyslipidemia, diabetes.    During her hospitalization the patient had an echocardiogram which showed her ejection fraction to be 55 to 60%.  There was no significant valvular flow abnormalities other than trace to mild MR.    The patient underwent CTA of her coronaries which showed overall only a mild amount of coronary plaque.  Calcium score was 2.8.  The LAD had minimal luminal irregularities and minimal calcification otherwise normal coronary anatomy.  Cardiac event monitor was placed at discharge which showed underlying rhythm to be sinus rhythm.  No atrial fibrillation, no sustained ventricular or supraventricular tachyarrhythmias and no AV block.    Overall the patient reports she has been feeling well.  She denies any current or recent chest pain, dyspnea, PND, orthopnea, palpitations, near syncope, lower extremity edema or feelings of her heart racing.    She does report that she has some difficulty remembering to take her evening dose of metoprolol XL.        ASSESSMENT/PLAN:      Diagnoses and all orders for this visit:    1. Hypertension, unspecified type (Primary)    2. Hyperlipidemia, unspecified hyperlipidemia type    Other orders  -     metoprolol succinate XL (Toprol XL) 50 MG 24 hr tablet; Take 1 tablet by mouth Daily.  Dispense: 90 tablet; Refill: 3            MEDICAL DECISION MAKING:      Overall the patient reports she has been feeling well.  No chest pain or worsening dyspnea.  No palpitations at present.    EKG today shows sinus rhythm with no ectopic beats.  No ST or T wave segment  abnormalities.    She does report having some difficulty remembering to take her evening dose of metoprolol XL.  I would like to change her Toprol-XL dosing to 50 mg once daily for her convenience.  If she notices any new symptoms with this change of asked her to contact the office sooner.  She plans to have her lipids reassessed with her PCP.  Have made no changes in her medical therapy otherwise.  We will plan on seeing her back for scheduled follow-up in 1 year if there is any new or worsening problems of asked her to contact the office sooner.      Past Medical History:   Diagnosis Date    Diabetes mellitus     GERD (gastroesophageal reflux disease)     Hyperlipidemia     Hypertension        Past Surgical History:   Procedure Laterality Date    CHOLECYSTECTOMY      COLONOSCOPY      ENDOSCOPY      HYSTERECTOMY      TONSILLECTOMY      UPPER ENDOSCOPIC ULTRASOUND W/ FNA N/A 12/30/2024    Procedure: ENDOSCOPIC ULTRASOUND WITH ESOPHAGOGASTRODUODENOSCOPY WITH BIOPSY;  Surgeon: Derrell Mandujano MD;  Location: [facility] ENDOSCOPY;  Service: Gastroenterology;  Laterality: N/A;  GASTRIC NODULE         Current Outpatient Medications:     aspirin 81 MG EC tablet, Take 1 tablet by mouth Daily., Disp: , Rfl:     atorvastatin (LIPITOR) 10 MG tablet, Take 1 tablet by mouth Daily., Disp: , Rfl:     Continuous Glucose Sensor (FreeStyle Isabelle 3 Plus Sensor), , Disp: , Rfl:     dapagliflozin Propanediol (Farxiga) 10 MG tablet, Take 10 mg by mouth Daily., Disp: , Rfl:     esomeprazole (nexIUM) 20 MG capsule, Take 1 capsule by mouth Every Morning Before Breakfast., Disp: , Rfl:     levocetirizine (XYZAL) 5 MG tablet, Take 1 tablet by mouth Daily., Disp: , Rfl:     metFORMIN (GLUCOPHAGE) 500 MG tablet, Take 4 tablets by mouth Daily With Breakfast., Disp: , Rfl:     metoprolol succinate XL (Toprol XL) 50 MG 24 hr tablet, Take 1 tablet by mouth Daily., Disp: 90 tablet, Rfl: 3    multivitamin with minerals tablet tablet, Take 1 tablet by  mouth Daily., Disp: , Rfl:     Plecanatide (Trulance) 3 MG tablet, Take 1 tablet by mouth Daily., Disp: , Rfl:     Social History     Socioeconomic History    Marital status:    Tobacco Use    Smoking status: Never    Smokeless tobacco: Never   Vaping Use    Vaping status: Never Used   Substance and Sexual Activity    Alcohol use: Not Currently    Drug use: Never    Sexual activity: Defer       History reviewed. No pertinent family history.    The following portions of the patient's history were reviewed and updated as appropriate: allergies, current medications, past family history, past medical history, past social history, past surgical history and problem list.    Review of Systems   All other systems reviewed and are negative.      Pertinent items are noted in HPI, all other systems reviewed and negative    /83 (BP Location: Right arm, Patient Position: Sitting, Cuff Size: Adult)   Pulse 76   Resp 16   Wt 80.7 kg (178 lb)   SpO2 97%   BMI 32.55 kg/m² .  Objective     Vitals reviewed.   Constitutional:       General: Not in acute distress.     Appearance: Normal appearance. Well-developed.   Eyes:      Pupils: Pupils are equal, round, and reactive to light.   HENT:      Head: Normocephalic and atraumatic.   Neck:      Vascular: No JVD.   Pulmonary:      Effort: Pulmonary effort is normal.      Breath sounds: Normal breath sounds.   Cardiovascular:      Normal rate. Regular rhythm.   Edema:     Peripheral edema absent.   Abdominal:      General: There is no distension.      Palpations: Abdomen is soft.      Tenderness: There is no abdominal tenderness.   Musculoskeletal: Normal range of motion.      Cervical back: Normal range of motion and neck supple. Skin:     General: Skin is warm and dry.   Neurological:      Mental Status: Alert and oriented to person, place, and time.             ECG 12 Lead    Date/Time: 5/30/2025 8:41 AM  Performed by: Cayla Lake APRN    Authorized by: Jaya  BOWEN Kulkarni  Comparison: compared with previous ECG   Similar to previous ECG  Rhythm: sinus rhythm  Rate: normal  BPM: 76  Conduction: conduction normal  QRS axis: normal  Other findings: non-specific ST-T wave changes and low voltage          EKG ordered by and reviewed by me in office

## (undated) DEVICE — SINGLE-USE BIOPSY FORCEPS: Brand: RADIAL JAW 4

## (undated) DEVICE — BITEBLOCK ENDO W/STRAP 60F A/ LF DISP

## (undated) DEVICE — PK ENDO GI 50